# Patient Record
Sex: MALE | Race: WHITE | NOT HISPANIC OR LATINO
[De-identification: names, ages, dates, MRNs, and addresses within clinical notes are randomized per-mention and may not be internally consistent; named-entity substitution may affect disease eponyms.]

---

## 2019-09-03 ENCOUNTER — APPOINTMENT (OUTPATIENT)
Dept: INTERNAL MEDICINE | Facility: CLINIC | Age: 49
End: 2019-09-03
Payer: COMMERCIAL

## 2019-09-03 VITALS
SYSTOLIC BLOOD PRESSURE: 102 MMHG | BODY MASS INDEX: 22.88 KG/M2 | HEART RATE: 86 BPM | TEMPERATURE: 98.6 F | HEIGHT: 75 IN | WEIGHT: 184 LBS | OXYGEN SATURATION: 98 % | DIASTOLIC BLOOD PRESSURE: 61 MMHG

## 2019-09-03 DIAGNOSIS — K59.09 OTHER CONSTIPATION: ICD-10-CM

## 2019-09-03 DIAGNOSIS — L98.9 DISORDER OF THE SKIN AND SUBCUTANEOUS TISSUE, UNSPECIFIED: ICD-10-CM

## 2019-09-03 DIAGNOSIS — Z82.49 FAMILY HISTORY OF ISCHEMIC HEART DISEASE AND OTHER DISEASES OF THE CIRCULATORY SYSTEM: ICD-10-CM

## 2019-09-03 DIAGNOSIS — Z00.00 ENCOUNTER FOR GENERAL ADULT MEDICAL EXAMINATION W/OUT ABNORMAL FINDINGS: ICD-10-CM

## 2019-09-03 DIAGNOSIS — Z78.9 OTHER SPECIFIED HEALTH STATUS: ICD-10-CM

## 2019-09-03 PROCEDURE — 99386 PREV VISIT NEW AGE 40-64: CPT

## 2019-09-03 RX ORDER — OMEPRAZOLE, SODIUM BICARBONATE 40; 1680 MG/1; MG/1
40-1680 POWDER, FOR SUSPENSION ORAL
Refills: 0 | Status: DISCONTINUED | COMMUNITY
End: 2019-09-03

## 2019-09-03 RX ORDER — TADALAFIL 10 MG/1
10 TABLET, FILM COATED ORAL
Refills: 0 | Status: DISCONTINUED | COMMUNITY
End: 2019-09-03

## 2019-09-03 NOTE — HISTORY OF PRESENT ILLNESS
[FreeTextEntry1] : annual exam [de-identified] : annual\par - doing well recently moved to [place] from [place]\par - needs medication refill\par - recent physical from 6/2019 normal by previous PCP\par \par PreP\par - 1 recent episode of unprotected sex was placed on PEP which he finished recently\par - [facility]\par \par chronic constipation\par - has BM once/week\par \par HCM\par - colonoscopy due at 52yo

## 2019-09-03 NOTE — HEALTH RISK ASSESSMENT
[Excellent] : ~his/her~  mood as  excellent [Yes] : Yes [No falls in past year] : Patient reported no falls in the past year [0] : 2) Feeling down, depressed, or hopeless: Not at all (0) [None] : None [With Significant Other] : lives with significant other [Employed] : employed [] :  [High Risk Behavior] : high risk behavior [Sexually Active] : sexually active [Fully functional (bathing, dressing, toileting, transferring, walking, feeding)] : Fully functional (bathing, dressing, toileting, transferring, walking, feeding) [Fully functional (using the telephone, shopping, preparing meals, housekeeping, doing laundry, using] : Fully functional and needs no help or supervision to perform IADLs (using the telephone, shopping, preparing meals, housekeeping, doing laundry, using transportation, managing medications and managing finances) [With Patient/Caregiver] : With Patient/Caregiver [[id number]] : 0 [] : No [Change in mental status noted] : No change in mental status noted [Language] : denies difficulty with language [Behavior] : denies difficulty with behavior [Handling Complex Tasks] : denies difficulty handling complex tasks [Learning/Retaining New Information] : denies difficulty learning/retaining new information [Reasoning] : denies difficulty with reasoning [HIVDate] : 8/1/2019 [Spatial Ability and Orientation] : denies difficulty with spatial ability and orientation [AdvancecareDate] : 09/03/2019

## 2019-09-03 NOTE — PHYSICAL EXAM
[No Acute Distress] : no acute distress [Well Developed] : well developed [Well Nourished] : well nourished [Normal Sclera/Conjunctiva] : normal sclera/conjunctiva [Well-Appearing] : well-appearing [EOMI] : extraocular movements intact [Normal Outer Ear/Nose] : the outer ears and nose were normal in appearance [No JVD] : no jugular venous distention [Supple] : supple [No Lymphadenopathy] : no lymphadenopathy [Thyroid Normal, No Nodules] : the thyroid was normal and there were no nodules present [No Respiratory Distress] : no respiratory distress  [No Accessory Muscle Use] : no accessory muscle use [Normal Rate] : normal rate  [Clear to Auscultation] : lungs were clear to auscultation bilaterally [Regular Rhythm] : with a regular rhythm [Normal S1, S2] : normal S1 and S2 [No Murmur] : no murmur heard [No Varicosities] : no varicosities [No Edema] : there was no peripheral edema [No Palpable Aorta] : no palpable aorta [Soft] : abdomen soft [Non Tender] : non-tender [Non-distended] : non-distended [No Masses] : no abdominal mass palpated [No HSM] : no HSM [Normal Anterior Cervical Nodes] : no anterior cervical lymphadenopathy [Normal Supraclavicular Nodes] : no supraclavicular lymphadenopathy [No Rash] : no rash [Coordination Grossly Intact] : coordination grossly intact [Normal Gait] : normal gait [Deep Tendon Reflexes (DTR)] : deep tendon reflexes were 2+ and symmetric [Normal Affect] : the affect was normal [Alert and Oriented x3] : oriented to person, place, and time [Normal Insight/Judgement] : insight and judgment were intact [de-identified] : right lower lip, 1cm pale salmon colored lesion above vermillion boarder. ill-defined edges, no raised edges, no scabing

## 2019-09-03 NOTE — ASSESSMENT
[FreeTextEntry1] : - obtain labs from previous physical \par - obtain titers from employee health re hep A and B

## 2019-10-02 ENCOUNTER — MEDICATION RENEWAL (OUTPATIENT)
Age: 49
End: 2019-10-02

## 2019-10-11 ENCOUNTER — MEDICATION RENEWAL (OUTPATIENT)
Age: 49
End: 2019-10-11

## 2019-11-08 ENCOUNTER — MEDICATION RENEWAL (OUTPATIENT)
Age: 49
End: 2019-11-08

## 2019-12-11 ENCOUNTER — MEDICATION RENEWAL (OUTPATIENT)
Age: 49
End: 2019-12-11

## 2020-01-13 ENCOUNTER — RX RENEWAL (OUTPATIENT)
Age: 50
End: 2020-01-13

## 2020-02-13 ENCOUNTER — APPOINTMENT (OUTPATIENT)
Dept: INTERNAL MEDICINE | Facility: CLINIC | Age: 50
End: 2020-02-13
Payer: COMMERCIAL

## 2020-02-13 VITALS
DIASTOLIC BLOOD PRESSURE: 83 MMHG | HEART RATE: 86 BPM | HEIGHT: 75 IN | WEIGHT: 180 LBS | OXYGEN SATURATION: 99 % | TEMPERATURE: 98.2 F | BODY MASS INDEX: 22.38 KG/M2 | SYSTOLIC BLOOD PRESSURE: 131 MMHG

## 2020-02-13 PROCEDURE — 99214 OFFICE O/P EST MOD 30 MIN: CPT

## 2020-02-13 RX ORDER — BUPROPION HYDROCHLORIDE 300 MG/1
300 TABLET, EXTENDED RELEASE ORAL DAILY
Qty: 90 | Refills: 3 | Status: COMPLETED | COMMUNITY
End: 2020-02-13

## 2020-02-13 NOTE — HISTORY OF PRESENT ILLNESS
[FreeTextEntry1] : depression/anxiety f/u [de-identified] : depression\par - has worsened over the past few weeks\par - is also extremely anxious\par - employer asked him to get a letter to clear him for work\par - bupropion is no longer effective for pt. would like to try SSRI\par - previously on Celexa but caused severe sexual side effects, decreased libido and ED\par - is agreeable to Lexapro\par - follows w/ therapist, has difficulty finding psychiatrist who takes his insurance. \par - would like to try naltrexone\par - concerned b/c days he does not take klonopin he self medications w/ etoh. will have 3 beers at night\par - has family h/o alcoholism.

## 2020-02-13 NOTE — PHYSICAL EXAM
[Person] : oriented to person [Place] : oriented to place [Time] : oriented to time [Short Term Intact] : short term memory intact [Remote Intact] : remote memory intact [Concentration Intact] : normal concentrating ability [Span Intact] : the attention span was normal [Visual Intact] : visual attention was ~T not ~L decreased [Vocabulary] : adequate fund of knowledge regarding vocabulary [Anxious] : anxious [Depressed] : not depressed [Impaired judgment] : intact judgment [Impaired Insight] : intact insight [Normal Tone] : normal tone [Normal Articulation] : normal articulation [Normal Volume] : normal volume [Volume Increased] : no increase in volume [Normal Fluency] : fluent [Volume Decrease] : no decrease in volume [Normal Coherency] : coherent [Rate Pressured] : pressured [Rate Slowed] : not slowed [Normal] : normal throught processes [Normal Spontaneity] : spontaneous [Disconnected] : no disconnected thoughts [Slowed Rate Of Thought] : no slowing of rate of thought [Racing Thoughts] : no racing thoughts [Disordered] : no disordered thoughts [Perseveration] : no thought perseveration [Normal Associations] :  no deficiency [Loose Associations] : no loosening of associations [Delusions] : exhibited no delusions [Circumferential] : no circumferentiality [Tangentiality] : no tangentiality [Suicidal Ideation] : denied suicidal ideation [Suicidal Intent] : denied suicidal intent [Hallucinations] : exhibited no hallucinations [Suicidal Plan] : denied suicidal plans [Homicidal Ideation] : denied homicidal ideation [Homicidal Plan] : denied homicidal plans [Homicidal Intent] : denied homicidal intention

## 2020-03-19 ENCOUNTER — TRANSCRIPTION ENCOUNTER (OUTPATIENT)
Age: 50
End: 2020-03-19

## 2020-03-20 ENCOUNTER — TRANSCRIPTION ENCOUNTER (OUTPATIENT)
Age: 50
End: 2020-03-20

## 2020-03-20 RX ORDER — FINASTERIDE 1 MG/1
1 TABLET ORAL DAILY
Qty: 90 | Refills: 3 | Status: DISCONTINUED | COMMUNITY
End: 2020-03-20

## 2020-05-29 ENCOUNTER — TRANSCRIPTION ENCOUNTER (OUTPATIENT)
Age: 50
End: 2020-05-29

## 2020-05-29 DIAGNOSIS — Z72.51 HIGH RISK HETEROSEXUAL BEHAVIOR: ICD-10-CM

## 2020-06-08 ENCOUNTER — TRANSCRIPTION ENCOUNTER (OUTPATIENT)
Age: 50
End: 2020-06-08

## 2020-07-20 ENCOUNTER — RX RENEWAL (OUTPATIENT)
Age: 50
End: 2020-07-20

## 2020-07-30 ENCOUNTER — APPOINTMENT (OUTPATIENT)
Dept: INTERNAL MEDICINE | Facility: CLINIC | Age: 50
End: 2020-07-30
Payer: COMMERCIAL

## 2020-07-30 VITALS
HEIGHT: 75 IN | BODY MASS INDEX: 23.87 KG/M2 | SYSTOLIC BLOOD PRESSURE: 120 MMHG | HEART RATE: 71 BPM | WEIGHT: 192 LBS | OXYGEN SATURATION: 96 % | DIASTOLIC BLOOD PRESSURE: 78 MMHG | TEMPERATURE: 98.3 F

## 2020-07-30 PROCEDURE — 99213 OFFICE O/P EST LOW 20 MIN: CPT | Mod: 25

## 2020-07-30 PROCEDURE — 36415 COLL VENOUS BLD VENIPUNCTURE: CPT

## 2020-07-30 NOTE — HISTORY OF PRESENT ILLNESS
[FreeTextEntry1] : STD screen and medication refill [de-identified] : Anxiety\par - complient w/ medications\par - mood much improved\par \par PrEP\par - needs refill and STD screening today\par \par COVID19\par - he and  were both very sick with covid\par - have recovered  developed hematomas on LE\par \par EtOH abuse\par - naltrexone has decreased craving greatly.

## 2020-07-31 ENCOUNTER — TRANSCRIPTION ENCOUNTER (OUTPATIENT)
Age: 50
End: 2020-07-31

## 2020-07-31 LAB
APPEARANCE: CLEAR
BILIRUBIN URINE: NEGATIVE
BLOOD URINE: NEGATIVE
C TRACH RRNA SPEC QL NAA+PROBE: NOT DETECTED
COLOR: NORMAL
GLUCOSE QUALITATIVE U: NEGATIVE
HIV1+2 AB SPEC QL IA.RAPID: NONREACTIVE
KETONES URINE: NEGATIVE
LEUKOCYTE ESTERASE URINE: NEGATIVE
N GONORRHOEA RRNA SPEC QL NAA+PROBE: NOT DETECTED
NITRITE URINE: NEGATIVE
PH URINE: 6
PROTEIN URINE: NEGATIVE
SOURCE AMPLIFICATION: NORMAL
SPECIFIC GRAVITY URINE: 1.01
T PALLIDUM AB SER QL IA: NEGATIVE
UROBILINOGEN URINE: NORMAL

## 2020-10-20 ENCOUNTER — TRANSCRIPTION ENCOUNTER (OUTPATIENT)
Age: 50
End: 2020-10-20

## 2020-11-20 ENCOUNTER — TRANSCRIPTION ENCOUNTER (OUTPATIENT)
Age: 50
End: 2020-11-20

## 2020-12-23 ENCOUNTER — TRANSCRIPTION ENCOUNTER (OUTPATIENT)
Age: 50
End: 2020-12-23

## 2021-01-13 ENCOUNTER — TRANSCRIPTION ENCOUNTER (OUTPATIENT)
Age: 51
End: 2021-01-13

## 2021-02-11 ENCOUNTER — TRANSCRIPTION ENCOUNTER (OUTPATIENT)
Age: 51
End: 2021-02-11

## 2021-02-12 ENCOUNTER — TRANSCRIPTION ENCOUNTER (OUTPATIENT)
Age: 51
End: 2021-02-12

## 2021-02-12 DIAGNOSIS — Z11.3 ENCOUNTER FOR SCREENING FOR INFECTIONS WITH A PREDOMINANTLY SEXUAL MODE OF TRANSMISSION: ICD-10-CM

## 2021-04-01 ENCOUNTER — TRANSCRIPTION ENCOUNTER (OUTPATIENT)
Age: 51
End: 2021-04-01

## 2021-04-22 ENCOUNTER — EMERGENCY (EMERGENCY)
Facility: HOSPITAL | Age: 51
LOS: 1 days | Discharge: HOME | End: 2021-04-22
Attending: EMERGENCY MEDICINE | Admitting: EMERGENCY MEDICINE
Payer: COMMERCIAL

## 2021-04-22 ENCOUNTER — APPOINTMENT (OUTPATIENT)
Dept: INTERNAL MEDICINE | Facility: CLINIC | Age: 51
End: 2021-04-22
Payer: COMMERCIAL

## 2021-04-22 ENCOUNTER — NON-APPOINTMENT (OUTPATIENT)
Age: 51
End: 2021-04-22

## 2021-04-22 VITALS
SYSTOLIC BLOOD PRESSURE: 127 MMHG | RESPIRATION RATE: 18 BRPM | TEMPERATURE: 98 F | OXYGEN SATURATION: 98 % | HEART RATE: 82 BPM | DIASTOLIC BLOOD PRESSURE: 80 MMHG

## 2021-04-22 VITALS
RESPIRATION RATE: 18 BRPM | HEART RATE: 102 BPM | WEIGHT: 179.9 LBS | OXYGEN SATURATION: 98 % | HEIGHT: 75 IN | DIASTOLIC BLOOD PRESSURE: 73 MMHG | TEMPERATURE: 98 F | SYSTOLIC BLOOD PRESSURE: 124 MMHG

## 2021-04-22 VITALS
HEIGHT: 75 IN | WEIGHT: 180 LBS | BODY MASS INDEX: 22.38 KG/M2 | SYSTOLIC BLOOD PRESSURE: 131 MMHG | TEMPERATURE: 98.1 F | HEART RATE: 88 BPM | DIASTOLIC BLOOD PRESSURE: 79 MMHG | OXYGEN SATURATION: 98 %

## 2021-04-22 DIAGNOSIS — N45.3 EPIDIDYMO-ORCHITIS: ICD-10-CM

## 2021-04-22 DIAGNOSIS — N50.812 LEFT TESTICULAR PAIN: ICD-10-CM

## 2021-04-22 LAB
ALBUMIN SERPL ELPH-MCNC: 4.5 G/DL — SIGNIFICANT CHANGE UP (ref 3.3–5)
ALP SERPL-CCNC: 139 U/L — HIGH (ref 40–120)
ALT FLD-CCNC: 22 U/L — SIGNIFICANT CHANGE UP (ref 10–45)
ANION GAP SERPL CALC-SCNC: 10 MMOL/L — SIGNIFICANT CHANGE UP (ref 5–17)
APPEARANCE UR: ABNORMAL
AST SERPL-CCNC: 28 U/L — SIGNIFICANT CHANGE UP (ref 10–40)
BACTERIA # UR AUTO: PRESENT /HPF
BASOPHILS # BLD AUTO: 0.05 K/UL — SIGNIFICANT CHANGE UP (ref 0–0.2)
BASOPHILS NFR BLD AUTO: 0.4 % — SIGNIFICANT CHANGE UP (ref 0–2)
BILIRUB SERPL-MCNC: 0.2 MG/DL — SIGNIFICANT CHANGE UP (ref 0.2–1.2)
BILIRUB UR-MCNC: NEGATIVE — SIGNIFICANT CHANGE UP
BUN SERPL-MCNC: 9 MG/DL — SIGNIFICANT CHANGE UP (ref 7–23)
CALCIUM SERPL-MCNC: 9.2 MG/DL — SIGNIFICANT CHANGE UP (ref 8.4–10.5)
CHLORIDE SERPL-SCNC: 100 MMOL/L — SIGNIFICANT CHANGE UP (ref 96–108)
CO2 SERPL-SCNC: 31 MMOL/L — SIGNIFICANT CHANGE UP (ref 22–31)
COLOR SPEC: YELLOW — SIGNIFICANT CHANGE UP
COMMENT - URINE: SIGNIFICANT CHANGE UP
CREAT SERPL-MCNC: 0.84 MG/DL — SIGNIFICANT CHANGE UP (ref 0.5–1.3)
DIFF PNL FLD: ABNORMAL
EOSINOPHIL # BLD AUTO: 0.13 K/UL — SIGNIFICANT CHANGE UP (ref 0–0.5)
EOSINOPHIL NFR BLD AUTO: 1.1 % — SIGNIFICANT CHANGE UP (ref 0–6)
EPI CELLS # UR: SIGNIFICANT CHANGE UP /HPF (ref 0–5)
GLUCOSE SERPL-MCNC: 82 MG/DL — SIGNIFICANT CHANGE UP (ref 70–99)
GLUCOSE UR QL: NEGATIVE — SIGNIFICANT CHANGE UP
HCT VFR BLD CALC: 42.9 % — SIGNIFICANT CHANGE UP (ref 39–50)
HGB BLD-MCNC: 14.4 G/DL — SIGNIFICANT CHANGE UP (ref 13–17)
IMM GRANULOCYTES NFR BLD AUTO: 0.5 % — SIGNIFICANT CHANGE UP (ref 0–1.5)
KETONES UR-MCNC: NEGATIVE — SIGNIFICANT CHANGE UP
LEUKOCYTE ESTERASE UR-ACNC: ABNORMAL
LYMPHOCYTES # BLD AUTO: 1.55 K/UL — SIGNIFICANT CHANGE UP (ref 1–3.3)
LYMPHOCYTES # BLD AUTO: 13.7 % — SIGNIFICANT CHANGE UP (ref 13–44)
MCHC RBC-ENTMCNC: 31 PG — SIGNIFICANT CHANGE UP (ref 27–34)
MCHC RBC-ENTMCNC: 33.6 GM/DL — SIGNIFICANT CHANGE UP (ref 32–36)
MCV RBC AUTO: 92.3 FL — SIGNIFICANT CHANGE UP (ref 80–100)
MONOCYTES # BLD AUTO: 0.77 K/UL — SIGNIFICANT CHANGE UP (ref 0–0.9)
MONOCYTES NFR BLD AUTO: 6.8 % — SIGNIFICANT CHANGE UP (ref 2–14)
NEUTROPHILS # BLD AUTO: 8.76 K/UL — HIGH (ref 1.8–7.4)
NEUTROPHILS NFR BLD AUTO: 77.5 % — HIGH (ref 43–77)
NITRITE UR-MCNC: NEGATIVE — SIGNIFICANT CHANGE UP
NRBC # BLD: 0 /100 WBCS — SIGNIFICANT CHANGE UP (ref 0–0)
PH UR: 7 — SIGNIFICANT CHANGE UP (ref 5–8)
PLATELET # BLD AUTO: 308 K/UL — SIGNIFICANT CHANGE UP (ref 150–400)
POTASSIUM SERPL-MCNC: 3.9 MMOL/L — SIGNIFICANT CHANGE UP (ref 3.5–5.3)
POTASSIUM SERPL-SCNC: 3.9 MMOL/L — SIGNIFICANT CHANGE UP (ref 3.5–5.3)
PROT SERPL-MCNC: 7.5 G/DL — SIGNIFICANT CHANGE UP (ref 6–8.3)
PROT UR-MCNC: NEGATIVE MG/DL — SIGNIFICANT CHANGE UP
RBC # BLD: 4.65 M/UL — SIGNIFICANT CHANGE UP (ref 4.2–5.8)
RBC # FLD: 13.2 % — SIGNIFICANT CHANGE UP (ref 10.3–14.5)
RBC CASTS # UR COMP ASSIST: < 5 /HPF — SIGNIFICANT CHANGE UP
SODIUM SERPL-SCNC: 141 MMOL/L — SIGNIFICANT CHANGE UP (ref 135–145)
SP GR SPEC: 1.01 — SIGNIFICANT CHANGE UP (ref 1–1.03)
UROBILINOGEN FLD QL: 0.2 E.U./DL — SIGNIFICANT CHANGE UP
WBC # BLD: 11.32 K/UL — HIGH (ref 3.8–10.5)
WBC # FLD AUTO: 11.32 K/UL — HIGH (ref 3.8–10.5)
WBC UR QL: ABNORMAL /HPF

## 2021-04-22 PROCEDURE — 99284 EMERGENCY DEPT VISIT MOD MDM: CPT

## 2021-04-22 PROCEDURE — 99282 EMERGENCY DEPT VISIT SF MDM: CPT

## 2021-04-22 PROCEDURE — 87591 N.GONORRHOEAE DNA AMP PROB: CPT

## 2021-04-22 PROCEDURE — 36000 PLACE NEEDLE IN VEIN: CPT

## 2021-04-22 PROCEDURE — 36415 COLL VENOUS BLD VENIPUNCTURE: CPT

## 2021-04-22 PROCEDURE — 99214 OFFICE O/P EST MOD 30 MIN: CPT | Mod: 25

## 2021-04-22 PROCEDURE — 85025 COMPLETE CBC W/AUTO DIFF WBC: CPT

## 2021-04-22 PROCEDURE — 99284 EMERGENCY DEPT VISIT MOD MDM: CPT | Mod: 25

## 2021-04-22 PROCEDURE — 87086 URINE CULTURE/COLONY COUNT: CPT

## 2021-04-22 PROCEDURE — 81001 URINALYSIS AUTO W/SCOPE: CPT

## 2021-04-22 PROCEDURE — 80053 COMPREHEN METABOLIC PANEL: CPT

## 2021-04-22 PROCEDURE — 99072 ADDL SUPL MATRL&STAF TM PHE: CPT

## 2021-04-22 PROCEDURE — 76870 US EXAM SCROTUM: CPT | Mod: 26

## 2021-04-22 PROCEDURE — 87491 CHLMYD TRACH DNA AMP PROBE: CPT

## 2021-04-22 PROCEDURE — 76870 US EXAM SCROTUM: CPT

## 2021-04-22 PROCEDURE — 96372 THER/PROPH/DIAG INJ SC/IM: CPT

## 2021-04-22 RX ORDER — IBUPROFEN 200 MG
400 TABLET ORAL ONCE
Refills: 0 | Status: COMPLETED | OUTPATIENT
Start: 2021-04-22 | End: 2021-04-22

## 2021-04-22 RX ORDER — OXYCODONE AND ACETAMINOPHEN 5; 325 MG/1; MG/1
2 TABLET ORAL ONCE
Refills: 0 | Status: DISCONTINUED | OUTPATIENT
Start: 2021-04-22 | End: 2021-04-22

## 2021-04-22 RX ORDER — CEFTRIAXONE 500 MG/1
500 INJECTION, POWDER, FOR SOLUTION INTRAMUSCULAR; INTRAVENOUS ONCE
Refills: 0 | Status: COMPLETED | OUTPATIENT
Start: 2021-04-22 | End: 2021-04-22

## 2021-04-22 RX ORDER — IBUPROFEN 200 MG
1 TABLET ORAL
Qty: 28 | Refills: 0
Start: 2021-04-22 | End: 2021-04-28

## 2021-04-22 RX ORDER — SODIUM CHLORIDE 9 MG/ML
3 INJECTION INTRAMUSCULAR; INTRAVENOUS; SUBCUTANEOUS EVERY 8 HOURS
Refills: 0 | Status: DISCONTINUED | OUTPATIENT
Start: 2021-04-22 | End: 2021-04-26

## 2021-04-22 RX ADMIN — Medication 100 MILLIGRAM(S): at 17:11

## 2021-04-22 RX ADMIN — OXYCODONE AND ACETAMINOPHEN 2 TABLET(S): 5; 325 TABLET ORAL at 17:11

## 2021-04-22 RX ADMIN — CEFTRIAXONE 500 MILLIGRAM(S): 500 INJECTION, POWDER, FOR SOLUTION INTRAMUSCULAR; INTRAVENOUS at 17:11

## 2021-04-22 RX ADMIN — Medication 400 MILLIGRAM(S): at 18:45

## 2021-04-22 NOTE — ED PROVIDER NOTE - CARE PROVIDER_API CALL
Chuckie Robbins)  Urology [facility]  [address] [address]  Phone: [phone]  Fax: [phone]  Follow Up Time: 1-3 Days

## 2021-04-22 NOTE — CONSULT NOTE ADULT - ATTENDING COMMENTS
Patient imaging and story reviewed in detail and discussed.   Epididymorchitis.   Antibiotics and follow-up in clinic.

## 2021-04-22 NOTE — ED PROVIDER NOTE - GENITOURINARY SCROTUM
QUESTIONABLE LT FOCAL INFERIOR THAT CAN CONTAIN FLUID/SWELLING left inf aspect of scrotum with swelling noted erythema  no fluctuance or crepitus/SWELLING

## 2021-04-22 NOTE — ED ADULT NURSE NOTE - OBJECTIVE STATEMENT
Pt came to ED complaining of testicular swelling starting today, PT reports discomfort starting 3 days ago. Pt presents with scrotal swelling and pain, more on left side.   Pt reports having multiple episodes of unprotected sex for the past week. Pt also reports intermittent use of penile ring.  Pt denies fever, chills, D/N/V, chest pain, GI symptoms, SOB.  Pt is alert and oriented x3, ambulatory with even gait.   No bleeding or discharge noted at this time.

## 2021-04-22 NOTE — ED PROVIDER NOTE - PATIENT PORTAL LINK FT
You can access the FollowMyHealth Patient Portal offered by [facility] by registering at the following website: [email or web address]. By joining [email or web address]’s FollowMyHealth portal, you will also be able to view your health information using other applications (apps) compatible with our system.

## 2021-04-22 NOTE — CONSULT NOTE ADULT - SUBJECTIVE AND OBJECTIVE BOX
CONSULT NOTE:    HPI: 51 yo with hx anxiety, depression, gonorrhea and cocaine use, on truvada for preop, presenting to ER for scrotal pain and swelling for two days. Patient states the swelling was worse this morning prompting him to go to his pmd who examined him and recommended he go to the ER.  Patient states he is recently seperated with his partner and has had unprotected sex with multiple male partners this past week. He denies any hematuria or penile discharge but admits to some discomfort with urinating. He denies any abd pain, nausea or vomiting, Denies any fever or chills      Vital Signs Last 24 Hrs  T(C): 36.7 (22 Apr 2021 16:33), Max: 36.7 (22 Apr 2021 16:33)  T(F): 98 (22 Apr 2021 16:33), Max: 98 (22 Apr 2021 16:33)  HR: 102 (22 Apr 2021 16:33) (102 - 102)  BP: 124/73 (22 Apr 2021 16:33) (124/73 - 124/73)  BP(mean): --  RR: 18 (22 Apr 2021 16:33) (18 - 18)  SpO2: 98% (22 Apr 2021 16:33) (98% - 98%)  I&O's Summary      PE:  Gen: nad  Abd: soft, nd, nt  : circumcised phallus non tender to palpation no blood or discharge from meatus, erythematous, edematous scrotum. non tender right testicle, Enlarged hardened left testicle exquistely tender to palpation with overlying cellulitic skin, no areas of fluctuance or crepitus appreciated at this time    LABS:                        14.4   11.32 )-----------( 308      ( 22 Apr 2021 16:58 )             42.9     04-22    141  |  100  |  9   ----------------------------<  82  3.9   |  31  |  0.84    Ca    9.2      22 Apr 2021 16:58    TPro  7.5  /  Alb  4.5  /  TBili  0.2  /  DBili  x   /  AST  28  /  ALT  22  /  AlkPhos  139<H>  04-22      Cultures      A/P 51 yo m with epididymoorchitis and 1 cm pyocele, afebrile and hemodynamically stable, wbc 11  1)ER administered rocephin and doxy rec 10 days course of doxy  2) f/u gc, chlam, syphillis, HIV  3) ice elevation and nsaids  4) discussed with patient importance of safe sexual practices  5) discussed with gu attending

## 2021-04-22 NOTE — ED PROVIDER NOTE - NSTIMEPROVIDERCAREINITIATE_GEN_ER
11/05/20 0731   Clinical Encounter Type   Visited With Family   Routine Visit Introduction  ([facility] requesed.   Completed over the phone (COVID restriction due to  in-person interview))   Referral From    Referral To Nurse;Physician  (The [facility] fo 22-Apr-2021 16:39

## 2021-04-22 NOTE — ED PROVIDER NOTE - GENITOURINARY [+], MLM
left testicular swelling/DIFFICULTY URINATING/DYSURIA left testicular swelling/ slight erythema / no fluctuance or crepitus/DIFFICULTY URINATING/DYSURIA

## 2021-04-22 NOTE — ED PROVIDER NOTE - OBJECTIVE STATEMENT
50 year old male presents left testicular swelling x1 day. Hx of GC x7 years ago no history of other stds. He was sent to the ed for evaluation by pcp. As per pt, he has unprotected sex multiple times with multiple people and likes to wear cock rings during sex. The last encounter was x3 days ago. Denies redness, hematuria, fever, discharge, nausea, chills or vomiting. Admits to unprotected anal sex with multiple people. He is able to pass urine with difficulty felt "trickling", as if not fully voiding. 50 year old male presents left testicular tenderness and swelling x1 day. Hx of GC x7 years ago no history of other stds. He was sent to the ed for evaluation by pcp. As per pt, he has unprotected sex multiple times with multiple people and likes to wear cock rings during sex. The last encounter was x3 days ago. Denies redness, hematuria, fever, discharge, nausea, chills or vomiting. Admits to unprotected anal sex with multiple people. He is able to pass urine with difficulty felt "trickling", as if not fully voiding.

## 2021-04-22 NOTE — ED PROVIDER NOTE - CLINICAL SUMMARY MEDICAL DECISION MAKING FREE TEXT BOX
50 year old male with multiple sexual partners and previous std episodes presents left testicular swelling x1 day. On exam, swelling of the left scrotum is noted with erythema no crepitus no lesion, no skin breaks or openings. Suspect epididymitis orchitis. Plan administer Rocephin + doxycycline; send for scrotal doppler. Low suspicion for testicular torsion.

## 2021-04-22 NOTE — HISTORY OF PRESENT ILLNESS
[FreeTextEntry8] : Testicular pain\par - progressive over the past 4 days\par - a/w scrotal swelling L>R\par - testicles are very tender to palpation\par - no help w/ ibuprofen\par - accidentally fell asleep with external compression ring \par \par PrEP\par - needs screening today

## 2021-04-22 NOTE — PHYSICAL EXAM
[No Respiratory Distress] : no respiratory distress  [Penis Abnormality] : normal circumcised penis [de-identified] : unable to sit b/c of pain [FreeTextEntry1] : testicular swelling L>R very tender to palpation, firm mass palpable.

## 2021-04-22 NOTE — ED PROVIDER NOTE - NSFOLLOWUPINSTRUCTIONS_ED_ALL_ED_FT
Epididymo-Orchitis    AMBULATORY CARE:    Epididymo-orchitis is inflammation of your epididymis and testicle. The epididymis is a coiled tube inside your scrotum. It stores and carries sperm from your testicles to your penis. Epididymo-orchitis usually affects the epididymis and testicle on one side, but it may affect both sides.    Male Reproductive System         What are the signs and symptoms of epididymo-orchitis?   •Pain or tenderness in your scrotum, abdomen, or groin      •Redness or swelling of your scrotum      •Pain or burning during urination, or frequent urination      •Discharge from your penis or blood in your urine or semen      •Fever      Seek care immediately if:   •You have severe pain in your testicles.      •Your symptoms become worse even after you start treatment with medicine.      Contact your healthcare provider if:   •Your symptoms do not get better within 3 days of treatment or come back after treatment.      •You have a hot, red, tender area on your testicles.      •You have questions or concerns about your condition or care.      Treatment depends on the cause of your epididymo-orchitis and may include any of the following:   •Antibiotics help treat a bacterial infection.       •NSAIDs, such as ibuprofen, help decrease swelling, pain, and fever. This medicine is available with or without a doctor's order. NSAIDs can cause stomach bleeding or kidney problems in certain people. If you take blood thinner medicine, always ask if NSAIDs are safe for you. Always read the medicine label and follow directions. Do not give these medicines to children under 6 months of age without direction from your child's healthcare provider.      •Acetaminophen decreases pain and fever. It is available without a doctor's order. Ask how much to take and how often to take it. Follow directions. Acetaminophen can cause liver damage if not taken correctly.      •Prescription pain medicine may be given. Ask how to take this medicine safely.      •Surgery may be needed if your condition gets worse or becomes chronic. Surgery to drain an abscess (collection of pus) may be needed. Surgery to remove part or all of your epididymis or testicle may also be done.       Manage epididymo-orchitis:   •Apply ice on your testicles for 15 to 20 minutes every hour or as directed. Use an ice pack, or put crushed ice in a plastic bag. Cover it with a towel. Ice helps prevent tissue damage and decreases swelling and pain.      •Rest in bed as directed. Elevate your scrotum when you sit or lie down to help reduce swelling and pain. You may be asked to do this by placing a rolled-up towel under your scrotum.      •Scrotal support may be recommended. An athletic supporter provides scrotal support and may make you more comfortable when you stand. Ask your healthcare provider how to use an athletic supporter.       •Do not lift heavy objects. You can make swelling worse if you lift heavy objects or strain.       Follow up with your healthcare provider as directed: Write down your questions so you remember to ask them during your visits.        © Copyright [employer] 2021           back to top                          © Copyright [employer] 2021

## 2021-04-23 ENCOUNTER — TRANSCRIPTION ENCOUNTER (OUTPATIENT)
Age: 51
End: 2021-04-23

## 2021-04-23 PROBLEM — N45.3 EPIDIDYMO-ORCHITIS: Status: ACTIVE | Noted: 2021-04-22

## 2021-04-23 LAB
ALBUMIN SERPL ELPH-MCNC: 4.4 G/DL
ALP BLD-CCNC: 128 U/L
ALT SERPL-CCNC: 21 U/L
ANION GAP SERPL CALC-SCNC: 12 MMOL/L
AST SERPL-CCNC: 25 U/L
BASOPHILS # BLD AUTO: 0.05 K/UL
BASOPHILS NFR BLD AUTO: 0.5 %
BILIRUB SERPL-MCNC: <0.2 MG/DL
BUN SERPL-MCNC: 13 MG/DL
C TRACH RRNA SPEC QL NAA+PROBE: NOT DETECTED
C TRACH RRNA SPEC QL NAA+PROBE: SIGNIFICANT CHANGE UP
CALCIUM SERPL-MCNC: 9.6 MG/DL
CHLORIDE SERPL-SCNC: 101 MMOL/L
CO2 SERPL-SCNC: 30 MMOL/L
CREAT SERPL-MCNC: 0.94 MG/DL
CRP SERPL-MCNC: 14 MG/L
EOSINOPHIL # BLD AUTO: 0.12 K/UL
EOSINOPHIL NFR BLD AUTO: 1.1 %
GLUCOSE SERPL-MCNC: 84 MG/DL
HCT VFR BLD CALC: 45.2 %
HGB BLD-MCNC: 14.2 G/DL
HIV1+2 AB SPEC QL IA.RAPID: NONREACTIVE
IMM GRANULOCYTES NFR BLD AUTO: 0.3 %
LYMPHOCYTES # BLD AUTO: 1.39 K/UL
LYMPHOCYTES NFR BLD AUTO: 13.1 %
MAN DIFF?: NORMAL
MCHC RBC-ENTMCNC: 30.5 PG
MCHC RBC-ENTMCNC: 31.4 GM/DL
MCV RBC AUTO: 97 FL
MONOCYTES # BLD AUTO: 1.04 K/UL
MONOCYTES NFR BLD AUTO: 9.8 %
N GONORRHOEA RRNA SPEC QL NAA+PROBE: NOT DETECTED
N GONORRHOEA RRNA SPEC QL NAA+PROBE: SIGNIFICANT CHANGE UP
NEUTROPHILS # BLD AUTO: 8 K/UL
NEUTROPHILS NFR BLD AUTO: 75.2 %
PLATELET # BLD AUTO: 317 K/UL
POTASSIUM SERPL-SCNC: 4 MMOL/L
PROT SERPL-MCNC: 7 G/DL
RBC # BLD: 4.66 M/UL
RBC # FLD: 13.8 %
SODIUM SERPL-SCNC: 142 MMOL/L
SOURCE AMPLIFICATION: NORMAL
SPECIMEN SOURCE: SIGNIFICANT CHANGE UP
T PALLIDUM AB SER QL IA: NEGATIVE
WBC # FLD AUTO: 10.63 K/UL

## 2021-04-24 LAB
CULTURE RESULTS: SIGNIFICANT CHANGE UP
SPECIMEN SOURCE: SIGNIFICANT CHANGE UP

## 2021-06-28 ENCOUNTER — TRANSCRIPTION ENCOUNTER (OUTPATIENT)
Age: 51
End: 2021-06-28

## 2021-07-24 ENCOUNTER — INPATIENT (INPATIENT)
Facility: HOSPITAL | Age: 51
LOS: 2 days | Discharge: HOME | DRG: 728 | End: 2021-07-27
Attending: STUDENT IN AN ORGANIZED HEALTH CARE EDUCATION/TRAINING PROGRAM | Admitting: STUDENT IN AN ORGANIZED HEALTH CARE EDUCATION/TRAINING PROGRAM
Payer: COMMERCIAL

## 2021-07-24 VITALS
HEART RATE: 81 BPM | OXYGEN SATURATION: 98 % | TEMPERATURE: 98 F | WEIGHT: 86.2 LBS | DIASTOLIC BLOOD PRESSURE: 49 MMHG | HEIGHT: 75 IN | RESPIRATION RATE: 18 BRPM | SYSTOLIC BLOOD PRESSURE: 110 MMHG

## 2021-07-24 DIAGNOSIS — Z29.9 ENCOUNTER FOR PROPHYLACTIC MEASURES, UNSPECIFIED: ICD-10-CM

## 2021-07-24 DIAGNOSIS — Z87.448 PERSONAL HISTORY OF OTHER DISEASES OF URINARY SYSTEM: Chronic | ICD-10-CM

## 2021-07-24 DIAGNOSIS — F41.9 ANXIETY DISORDER, UNSPECIFIED: ICD-10-CM

## 2021-07-24 DIAGNOSIS — N39.0 URINARY TRACT INFECTION, SITE NOT SPECIFIED: ICD-10-CM

## 2021-07-24 DIAGNOSIS — F32.9 MAJOR DEPRESSIVE DISORDER, SINGLE EPISODE, UNSPECIFIED: ICD-10-CM

## 2021-07-24 DIAGNOSIS — N45.3 EPIDIDYMO-ORCHITIS: ICD-10-CM

## 2021-07-24 DIAGNOSIS — K21.9 GASTRO-ESOPHAGEAL REFLUX DISEASE WITHOUT ESOPHAGITIS: ICD-10-CM

## 2021-07-24 DIAGNOSIS — D64.9 ANEMIA, UNSPECIFIED: ICD-10-CM

## 2021-07-24 LAB
ANION GAP SERPL CALC-SCNC: 8 MMOL/L — SIGNIFICANT CHANGE UP (ref 5–17)
APPEARANCE UR: ABNORMAL
BACTERIA # UR AUTO: ABNORMAL /HPF
BASOPHILS # BLD AUTO: 0.05 K/UL — SIGNIFICANT CHANGE UP (ref 0–0.2)
BASOPHILS NFR BLD AUTO: 0.4 % — SIGNIFICANT CHANGE UP (ref 0–2)
BILIRUB UR-MCNC: ABNORMAL
BUN SERPL-MCNC: 8 MG/DL — SIGNIFICANT CHANGE UP (ref 7–23)
CALCIUM SERPL-MCNC: 9.1 MG/DL — SIGNIFICANT CHANGE UP (ref 8.4–10.5)
CHLORIDE SERPL-SCNC: 101 MMOL/L — SIGNIFICANT CHANGE UP (ref 96–108)
CO2 SERPL-SCNC: 32 MMOL/L — HIGH (ref 22–31)
COLOR SPEC: YELLOW — SIGNIFICANT CHANGE UP
COMMENT - URINE: SIGNIFICANT CHANGE UP
CREAT SERPL-MCNC: 0.79 MG/DL — SIGNIFICANT CHANGE UP (ref 0.5–1.3)
DIFF PNL FLD: ABNORMAL
EOSINOPHIL # BLD AUTO: 0.22 K/UL — SIGNIFICANT CHANGE UP (ref 0–0.5)
EOSINOPHIL NFR BLD AUTO: 1.6 % — SIGNIFICANT CHANGE UP (ref 0–6)
EPI CELLS # UR: SIGNIFICANT CHANGE UP /HPF (ref 0–5)
GLUCOSE SERPL-MCNC: 109 MG/DL — HIGH (ref 70–99)
GLUCOSE UR QL: NEGATIVE — SIGNIFICANT CHANGE UP
HCT VFR BLD CALC: 37 % — LOW (ref 39–50)
HGB BLD-MCNC: 11.7 G/DL — LOW (ref 13–17)
HIV 1+2 AB+HIV1 P24 AG SERPL QL IA: SIGNIFICANT CHANGE UP
IMM GRANULOCYTES NFR BLD AUTO: 0.7 % — SIGNIFICANT CHANGE UP (ref 0–1.5)
KETONES UR-MCNC: NEGATIVE — SIGNIFICANT CHANGE UP
LEUKOCYTE ESTERASE UR-ACNC: ABNORMAL
LYMPHOCYTES # BLD AUTO: 1.2 K/UL — SIGNIFICANT CHANGE UP (ref 1–3.3)
LYMPHOCYTES # BLD AUTO: 8.8 % — LOW (ref 13–44)
MCHC RBC-ENTMCNC: 28.9 PG — SIGNIFICANT CHANGE UP (ref 27–34)
MCHC RBC-ENTMCNC: 31.6 GM/DL — LOW (ref 32–36)
MCV RBC AUTO: 91.4 FL — SIGNIFICANT CHANGE UP (ref 80–100)
MONOCYTES # BLD AUTO: 1.25 K/UL — HIGH (ref 0–0.9)
MONOCYTES NFR BLD AUTO: 9.1 % — SIGNIFICANT CHANGE UP (ref 2–14)
NEUTROPHILS # BLD AUTO: 10.88 K/UL — HIGH (ref 1.8–7.4)
NEUTROPHILS NFR BLD AUTO: 79.4 % — HIGH (ref 43–77)
NITRITE UR-MCNC: POSITIVE
NRBC # BLD: 0 /100 WBCS — SIGNIFICANT CHANGE UP (ref 0–0)
PH UR: 6 — SIGNIFICANT CHANGE UP (ref 5–8)
PLATELET # BLD AUTO: 314 K/UL — SIGNIFICANT CHANGE UP (ref 150–400)
POTASSIUM SERPL-MCNC: 4 MMOL/L — SIGNIFICANT CHANGE UP (ref 3.5–5.3)
POTASSIUM SERPL-SCNC: 4 MMOL/L — SIGNIFICANT CHANGE UP (ref 3.5–5.3)
PROT UR-MCNC: 100 MG/DL
RBC # BLD: 4.05 M/UL — LOW (ref 4.2–5.8)
RBC # FLD: 13.4 % — SIGNIFICANT CHANGE UP (ref 10.3–14.5)
RBC CASTS # UR COMP ASSIST: < 5 /HPF — SIGNIFICANT CHANGE UP
SARS-COV-2 RNA SPEC QL NAA+PROBE: SIGNIFICANT CHANGE UP
SODIUM SERPL-SCNC: 141 MMOL/L — SIGNIFICANT CHANGE UP (ref 135–145)
SP GR SPEC: 1.02 — SIGNIFICANT CHANGE UP (ref 1–1.03)
UROBILINOGEN FLD QL: 2 E.U./DL
WBC # BLD: 13.7 K/UL — HIGH (ref 3.8–10.5)
WBC # FLD AUTO: 13.7 K/UL — HIGH (ref 3.8–10.5)
WBC UR QL: ABNORMAL /HPF

## 2021-07-24 PROCEDURE — 99285 EMERGENCY DEPT VISIT HI MDM: CPT

## 2021-07-24 PROCEDURE — 99223 1ST HOSP IP/OBS HIGH 75: CPT | Mod: GC

## 2021-07-24 PROCEDURE — 76870 US EXAM SCROTUM: CPT | Mod: 26

## 2021-07-24 RX ORDER — TRAMADOL HYDROCHLORIDE 50 MG/1
50 TABLET ORAL ONCE
Refills: 0 | Status: DISCONTINUED | OUTPATIENT
Start: 2021-07-24 | End: 2021-07-24

## 2021-07-24 RX ORDER — ESCITALOPRAM OXALATE 10 MG/1
1 TABLET, FILM COATED ORAL
Qty: 0 | Refills: 0 | DISCHARGE

## 2021-07-24 RX ORDER — KETOROLAC TROMETHAMINE 30 MG/ML
30 SYRINGE (ML) INJECTION ONCE
Refills: 0 | Status: DISCONTINUED | OUTPATIENT
Start: 2021-07-24 | End: 2021-07-24

## 2021-07-24 RX ORDER — CLONAZEPAM 1 MG
1 TABLET ORAL
Qty: 0 | Refills: 0 | DISCHARGE

## 2021-07-24 RX ORDER — EMTRICITABINE AND TENOFOVIR DISOPROXIL FUMARATE 200; 300 MG/1; MG/1
1 TABLET, FILM COATED ORAL
Qty: 0 | Refills: 0 | DISCHARGE

## 2021-07-24 RX ORDER — PANTOPRAZOLE SODIUM 20 MG/1
40 TABLET, DELAYED RELEASE ORAL EVERY 12 HOURS
Refills: 0 | Status: DISCONTINUED | OUTPATIENT
Start: 2021-07-25 | End: 2021-07-27

## 2021-07-24 RX ORDER — CLONAZEPAM 1 MG
1 TABLET ORAL EVERY 24 HOURS
Refills: 0 | Status: DISCONTINUED | OUTPATIENT
Start: 2021-07-24 | End: 2021-07-24

## 2021-07-24 RX ORDER — BUPROPION HYDROCHLORIDE 150 MG/1
150 TABLET, EXTENDED RELEASE ORAL
Qty: 0 | Refills: 0 | DISCHARGE

## 2021-07-24 RX ORDER — CLONAZEPAM 1 MG
1 TABLET ORAL EVERY 12 HOURS
Refills: 0 | Status: DISCONTINUED | OUTPATIENT
Start: 2021-07-24 | End: 2021-07-27

## 2021-07-24 RX ORDER — SODIUM CHLORIDE 9 MG/ML
1000 INJECTION INTRAMUSCULAR; INTRAVENOUS; SUBCUTANEOUS ONCE
Refills: 0 | Status: COMPLETED | OUTPATIENT
Start: 2021-07-24 | End: 2021-07-24

## 2021-07-24 RX ORDER — TRAZODONE HCL 50 MG
1 TABLET ORAL
Qty: 0 | Refills: 0 | DISCHARGE

## 2021-07-24 RX ORDER — CLONAZEPAM 1 MG
0 TABLET ORAL
Qty: 0 | Refills: 0 | DISCHARGE

## 2021-07-24 RX ORDER — KETOROLAC TROMETHAMINE 30 MG/ML
15 SYRINGE (ML) INJECTION EVERY 6 HOURS
Refills: 0 | Status: DISCONTINUED | OUTPATIENT
Start: 2021-07-24 | End: 2021-07-27

## 2021-07-24 RX ORDER — CEFTRIAXONE 500 MG/1
2000 INJECTION, POWDER, FOR SOLUTION INTRAMUSCULAR; INTRAVENOUS EVERY 24 HOURS
Refills: 0 | Status: DISCONTINUED | OUTPATIENT
Start: 2021-07-25 | End: 2021-07-27

## 2021-07-24 RX ORDER — CEFTRIAXONE 500 MG/1
1000 INJECTION, POWDER, FOR SOLUTION INTRAMUSCULAR; INTRAVENOUS EVERY 24 HOURS
Refills: 0 | Status: COMPLETED | OUTPATIENT
Start: 2021-07-24 | End: 2021-07-24

## 2021-07-24 RX ORDER — OMEPRAZOLE 10 MG/1
1 CAPSULE, DELAYED RELEASE ORAL
Qty: 0 | Refills: 0 | DISCHARGE

## 2021-07-24 RX ORDER — TRAZODONE HCL 50 MG
150 TABLET ORAL EVERY 24 HOURS
Refills: 0 | Status: DISCONTINUED | OUTPATIENT
Start: 2021-07-24 | End: 2021-07-27

## 2021-07-24 RX ORDER — KETOROLAC TROMETHAMINE 30 MG/ML
15 SYRINGE (ML) INJECTION EVERY 6 HOURS
Refills: 0 | Status: DISCONTINUED | OUTPATIENT
Start: 2021-07-24 | End: 2021-07-24

## 2021-07-24 RX ORDER — BUPROPION HYDROCHLORIDE 150 MG/1
150 TABLET, EXTENDED RELEASE ORAL EVERY 24 HOURS
Refills: 0 | Status: DISCONTINUED | OUTPATIENT
Start: 2021-07-25 | End: 2021-07-27

## 2021-07-24 RX ORDER — CEFTRIAXONE 500 MG/1
1000 INJECTION, POWDER, FOR SOLUTION INTRAMUSCULAR; INTRAVENOUS ONCE
Refills: 0 | Status: COMPLETED | OUTPATIENT
Start: 2021-07-24 | End: 2021-07-24

## 2021-07-24 RX ORDER — FINASTERIDE 5 MG/1
1 TABLET, FILM COATED ORAL
Qty: 0 | Refills: 0 | DISCHARGE

## 2021-07-24 RX ORDER — ACETAMINOPHEN 500 MG
650 TABLET ORAL EVERY 6 HOURS
Refills: 0 | Status: DISCONTINUED | OUTPATIENT
Start: 2021-07-24 | End: 2021-07-27

## 2021-07-24 RX ORDER — ESCITALOPRAM OXALATE 10 MG/1
20 TABLET, FILM COATED ORAL EVERY 24 HOURS
Refills: 0 | Status: DISCONTINUED | OUTPATIENT
Start: 2021-07-24 | End: 2021-07-27

## 2021-07-24 RX ADMIN — Medication 30 MILLIGRAM(S): at 16:41

## 2021-07-24 RX ADMIN — SODIUM CHLORIDE 1000 MILLILITER(S): 9 INJECTION INTRAMUSCULAR; INTRAVENOUS; SUBCUTANEOUS at 15:50

## 2021-07-24 RX ADMIN — TRAMADOL HYDROCHLORIDE 50 MILLIGRAM(S): 50 TABLET ORAL at 13:55

## 2021-07-24 RX ADMIN — CEFTRIAXONE 100 MILLIGRAM(S): 500 INJECTION, POWDER, FOR SOLUTION INTRAMUSCULAR; INTRAVENOUS at 16:11

## 2021-07-24 RX ADMIN — CEFTRIAXONE 100 MILLIGRAM(S): 500 INJECTION, POWDER, FOR SOLUTION INTRAMUSCULAR; INTRAVENOUS at 21:50

## 2021-07-24 RX ADMIN — ESCITALOPRAM OXALATE 20 MILLIGRAM(S): 10 TABLET, FILM COATED ORAL at 22:00

## 2021-07-24 RX ADMIN — SODIUM CHLORIDE 1000 MILLILITER(S): 9 INJECTION INTRAMUSCULAR; INTRAVENOUS; SUBCUTANEOUS at 13:56

## 2021-07-24 RX ADMIN — Medication 100 MILLIGRAM(S): at 16:19

## 2021-07-24 RX ADMIN — TRAMADOL HYDROCHLORIDE 50 MILLIGRAM(S): 50 TABLET ORAL at 14:50

## 2021-07-24 RX ADMIN — Medication 15 MILLIGRAM(S): at 22:08

## 2021-07-24 RX ADMIN — Medication 15 MILLIGRAM(S): at 22:38

## 2021-07-24 RX ADMIN — Medication 30 MILLIGRAM(S): at 16:11

## 2021-07-24 NOTE — ED PROVIDER NOTE - PROGRESS NOTE DETAILS
Uro consulted re: possible abscess on US w/ epididymoorchitis. Ceftriaxone + Doxy for UA / aforementioned. Pt reports US was painful and requested more analgesia. Pending Uro recommendations and eval PT seen and examined by Urology, images reviewed. No acute surgical intervention. Recommends admission to medicine for IV abx

## 2021-07-24 NOTE — ED ADULT TRIAGE NOTE - CHIEF COMPLAINT QUOTE
" I have fever and chills for the past week and testicular pain and L testicular swelling for 3 days".

## 2021-07-24 NOTE — CONSULT NOTE ADULT - SUBJECTIVE AND OBJECTIVE BOX
HPI: 50 yo with hx anxiety, depression, gonorrhea and cocaine use, on Truvada , presenting to ER for left scrotal swelling and pain that has worsening today.  He has history of UTI. He was at a recent sex party.  He is urinating well?  He was seen in April 2021 that he had     Patient states the swelling was worse this morning prompting him to go to his pmd who examined him and recommended he go to the ER.  Patient states he is recently  with his partner and has had unprotected sex with multiple male partners this past week. He denies any hematuria or penile discharge but admits to some discomfort with urinating. He denies any abd pain, nausea or vomiting, Denies any fever or chills    Vital Signs Last 24 Hrs  T(C): 36.6 (24 Jul 2021 13:10), Max: 36.6 (24 Jul 2021 13:10)  T(F): 97.8 (24 Jul 2021 13:10), Max: 97.8 (24 Jul 2021 13:10)  HR: 81 (24 Jul 2021 13:10) (81 - 81)  BP: 110/49 (24 Jul 2021 13:10) (110/49 - 110/49)  BP(mean): --  RR: 18 (24 Jul 2021 13:10) (18 - 18)  SpO2: 98% (24 Jul 2021 13:10) (98% - 98%)  I&O's Summary      PE:  Gen: NAD  Abd:  :  JOSHUA:    LABS:                        11.7   13.70 )-----------( 314      ( 24 Jul 2021 14:00 )             37.0     07-24    141  |  101  |  8   ----------------------------<  109<H>  4.0   |  32<H>  |  0.79    Ca    9.1      24 Jul 2021 14:00        Cultures      A/P    -US reviewed   -+UA, given Ceftriaxone in the ED HPI: 52 yo with hx anxiety, depression, gonorrhea and cocaine use, on Truvada , presenting to ER for left scrotal swelling and pain that has worsening today.  He has history of UTI. He was at a recent sex party.  He is urinating well?  He was seen in April 2021 that he had     Patient states the swelling was worse this morning prompting him to go to his pmd who examined him and recommended he go to the ER.  Patient states he is recently  with his partner and has had unprotected sex with multiple male partners this past week. He denies any hematuria or penile discharge but admits to some discomfort with urinating. He denies any abd pain, nausea or vomiting, Denies any fever or chills    Vital Signs Last 24 Hrs  T(C): 36.6 (24 Jul 2021 13:10), Max: 36.6 (24 Jul 2021 13:10)  T(F): 97.8 (24 Jul 2021 13:10), Max: 97.8 (24 Jul 2021 13:10)  HR: 81 (24 Jul 2021 13:10) (81 - 81)  BP: 110/49 (24 Jul 2021 13:10) (110/49 - 110/49)  BP(mean): --  RR: 18 (24 Jul 2021 13:10) (18 - 18)  SpO2: 98% (24 Jul 2021 13:10) (98% - 98%)  I&O's Summary      PE:  Gen: NAD  Abd:  :circumcised phallus non tender to palpation no blood or discharge from meatus, erythematous, edematous scrotum.     non tender right testicle, Enlarged hardened left testicle exquistely tender to palpation with overlying cellulitic skin, no areas of fluctuance or crepitus appreciated at this time***      LABS:                        11.7   13.70 )-----------( 314      ( 24 Jul 2021 14:00 )             37.0     07-24    141  |  101  |  8   ----------------------------<  109<H>  4.0   |  32<H>  |  0.79    Ca    9.1      24 Jul 2021 14:00        Cultures      A/P    -US reviewed   -+UA, given Ceftriaxone in the ED HPI: 50 yo with hx anxiety, depression, gonorrhea, h/o UTI, pyelonephritis and cocaine use, on Truvada presenting to ER for left scrotal swelling and pain that has worsening today. He was at a sex party last Thursday doesn't believe he had intercourse after this he didn't feel well. He wasn't sure if it was depression or an illness. On Thursday, he started having testicular pain and swelling. The pain worsened over the days and today was at its worst. He is taking Advil 4 tablets BID. He reports subjective fevers at home but has not taken a temp. He as well reports chills. He has a history of UTI that he reports is related to intercourse. He denies any hematuria or penile discharge but admits to some discomfort with urinating. He denies any abd pain, flank pain,  nausea or vomiting.     Vital Signs Last 24 Hrs  T(C): 36.6 (24 Jul 2021 13:10), Max: 36.6 (24 Jul 2021 13:10)  T(F): 97.8 (24 Jul 2021 13:10), Max: 97.8 (24 Jul 2021 13:10)  HR: 81 (24 Jul 2021 13:10) (81 - 81)  BP: 110/49 (24 Jul 2021 13:10) (110/49 - 110/49)  BP(mean): --  RR: 18 (24 Jul 2021 13:10) (18 - 18)  SpO2: 98% (24 Jul 2021 13:10) (98% - 98%)  I&O's Summary      PE:  Gen: NAD  Abd: soft, nt/nd   : circumcised phallus non tender to palpation no blood or discharge from meatus, erythematous, mild edematous scrotum. Non tender right testicle, enlarged hardened left testicle with epididymal tail edema/hardened, tender to palpation with overlying cellulitic skin, no areas of fluctuance or crepitus appreciated at this time  Back: No CVAT       LABS:                        11.7   13.70 )-----------( 314      ( 24 Jul 2021 14:00 )             37.0     07-24    141  |  101  |  8   ----------------------------<  109<H>  4.0   |  32<H>  |  0.79    Ca    9.1      24 Jul 2021 14:00      Cultures    A/P 50 yo with hx anxiety, depression, gonorrhea, h/o UTI, pyelonephritis and cocaine use, on Truvada presenting to ER for left scrotal swelling and pain that has worsening today. He reports subjective fevers at home and chills.  He had an US scrotal today that showed since 4/22/2021, there has been worsening of left epididymoorchitis. There is a new hypoechoic, avascular focus within the left epididymal tail which may represent abscess. WBC 13.70. VSS    -US reviewed with patient and compared to prior US from 4/2021   -(+)UA, given Ceftriaxone and Doxy in the ED  -GC/Chlamydia pending   -Will PVR patient to ensure complete emptying  -Consult medicine for admission for IV abx   -Monitor for fever, chills or changes in PE   -Counseled on dosage of Advil     Case discussed  residents  HPI: 52 yo with hx anxiety, depression, gonorrhea, h/o UTI, pyelonephritis and cocaine use, on Truvada presenting to ER for left scrotal swelling and pain that has worsening today. He was at a sex party last Thursday doesn't believe he had intercourse after this he didn't feel well. He wasn't sure if it was depression or an illness. On Thursday, he started having testicular pain and swelling. The pain worsened over the days and today was at its worst. He is taking Advil 4 tablets BID. He reports subjective fevers at home but has not taken a temp. He as well reports chills. He was seen for this in April of this year and finished his course of abx but did not follow up. He has a history of UTI that he reports is related to intercourse. He denies any hematuria or penile discharge but admits to some discomfort with urinating. He denies any abd pain, flank pain,  nausea or vomiting.     Vital Signs Last 24 Hrs  T(C): 36.6 (24 Jul 2021 13:10), Max: 36.6 (24 Jul 2021 13:10)  T(F): 97.8 (24 Jul 2021 13:10), Max: 97.8 (24 Jul 2021 13:10)  HR: 81 (24 Jul 2021 13:10) (81 - 81)  BP: 110/49 (24 Jul 2021 13:10) (110/49 - 110/49)  BP(mean): --  RR: 18 (24 Jul 2021 13:10) (18 - 18)  SpO2: 98% (24 Jul 2021 13:10) (98% - 98%)  I&O's Summary      PE:  Gen: NAD  Abd: soft, nt/nd   : circumcised phallus non tender to palpation no blood or discharge from meatus, erythematous, mild edematous scrotum. Non tender right testicle, enlarged hardened left testicle with epididymal tail edema/hardened, tender to palpation with overlying cellulitic skin, no areas of fluctuance or crepitus appreciated at this time  Back: No CVAT       LABS:                        11.7   13.70 )-----------( 314      ( 24 Jul 2021 14:00 )             37.0     07-24    141  |  101  |  8   ----------------------------<  109<H>  4.0   |  32<H>  |  0.79    Ca    9.1      24 Jul 2021 14:00      Cultures    A/P 52 yo with hx anxiety, depression, gonorrhea, h/o UTI, pyelonephritis and cocaine use, on Truvada presenting to ER for left scrotal swelling and pain that has worsening today. He reports subjective fevers at home and chills.  He had an US scrotal today that showed since 4/22/2021, there has been worsening of left epididymoorchitis. There is a new hypoechoic, avascular focus within the left epididymal tail which may represent abscess. WBC 13.70. VSS    -US reviewed with patient and compared to prior US from 4/2021   -(+)UA, given Ceftriaxone and Doxy in the ED  -GC/Chlamydia pending   -Will PVR patient to ensure complete emptying  -Consult medicine for admission for IV abx   -Monitor for fever, chills or changes in PE   -Counseled on dosage of Advil     Case discussed  residents

## 2021-07-24 NOTE — H&P ADULT - PROBLEM SELECTOR PLAN 6
F: none  E: Replete K<4, Mg<2  N: Regular  DVT PPX: SCDs  Dispo; RMF->Home history of anxiety  - continue home Klonopin 1mg PO BID PRN for anxiety history of depression  - continue home Lexapro 20mg daily and Wellbutrin 150mg daily  - continue home Trazadone 150mg at bedtime for sleep

## 2021-07-24 NOTE — H&P ADULT - PROBLEM SELECTOR PLAN 7
F: none  E: Replete K<4, Mg<2  N: Regular  DVT PPX: SCDs  Dispo; RMF->Home history of anxiety  - continue home Klonopin 1mg PO BID PRN for anxiety

## 2021-07-24 NOTE — H&P ADULT - ATTENDING COMMENTS
reviewed pertinent data , h&p  patient seen and examined overnight     PE as above except pt in pain at time of examination; pt w/ enlarged erythematous left scrotal region, palpation deferred 2/2 pt distress, findings per PGY3.     1. recurring epididymo-orchitis , started on higher dose of ceftriaxone, c/w doxycycline, followup GC/chlamydia, UCTX, urology recs; pain control prn        rest of  plan as above

## 2021-07-24 NOTE — H&P ADULT - NSHPPHYSICALEXAM_GEN_ALL_CORE
Vital Signs Last 12 Hrs  T(F): 98.2 (07-24-21 @ 17:59), Max: 98.2 (07-24-21 @ 17:59)  HR: 69 (07-24-21 @ 17:59) (69 - 81)  BP: 116/67 (07-24-21 @ 17:59) (110/49 - 116/67)  BP(mean): --  RR: 16 (07-24-21 @ 17:59) (16 - 18)  SpO2: 98% (07-24-21 @ 17:59) (98% - 98%)    PHYSICAL EXAM:  Constitutional: NAD, comfortable in bed.  HEENT: NC/AT, PERRLA, EOMI, no conjunctival pallor or scleral icterus, MMM  Neck: Supple, no JVD  Respiratory: CTA B/L. No w/r/r.   Cardiovascular: RRR, normal S1 and S2, no m/r/g.   Gastrointestinal: +BS, soft NTND, no guarding or rebound tenderness, no palpable masses   : tender to palpation, enlarged, and hardened left testicle descending down to the epididymal tail. Cellulitic skin present on left testicle. Nontender right testicle. Circumcised phallus with no pain, blood, or discharge from meatus, erythematous. No areas of fluctuance or crepitus appreciated at this time  Extremities: wwp; no cyanosis, clubbing or edema.   Vascular: Pulses equal and strong throughout.   Neurological: AAOx3, no CN deficits, strength and sensation intact throughout.   Skin: No gross skin abnormalities or rashes

## 2021-07-24 NOTE — ED PROVIDER NOTE - PHYSICAL EXAMINATION
Constitutional: Well appearing, well nourished, awake, alert, oriented to person, place, time/situation and in no apparent distress.  ENMT: Airway patent.   Eyes: Clear bilaterally  Cardiac: Normal rate, regular rhythm.   Respiratory: No increased WOB, tachypnea, hypoxia, or accessory mm use. Pt speaks in full sentences.   Gastrointestinal: Abd soft, NT, ND. No guarding, rebound, or rigidity.   : circumcised. no penile discharge or lesions. + L testicular / scrotal swelling, mild erythema and some ttp. normal testicular lie.   Musculoskeletal: Range of motion is not limited  Neuro: Alert and oriented x 3, face symmetric and speech fluent. Nml gross motor movement, grossly non focal   Skin: Skin normal color for race, warm, dry and intact. No evidence of rash.  Psych: Alert and oriented to person, place, time/situation. normal mood and affect. no apparent risk to self or others.

## 2021-07-24 NOTE — H&P ADULT - PROBLEM SELECTOR PLAN 4
history of depression  - continue home Lexapro 20mg daily and Wellbutrin 150mg daily  - continue home Trazadone 150mg at bedtime for sleep on admission, hb 11.7 (hb 14.4, 3 months ago)  - no signs and symptoms of bleeding on exam  - f/u B12 and folate  - f/u iron panel  - qualifies for outpatient colonoscopy screening  - maintain active type and screens ADDENDUM: on truvada, encourage safe sex practices

## 2021-07-24 NOTE — H&P ADULT - PROBLEM SELECTOR PLAN 5
history of anxiety  - continue home Klonopin 1mg PO BID PRN for anxiety history of depression  - continue home Lexapro 20mg daily and Wellbutrin 150mg daily  - continue home Trazadone 150mg at bedtime for sleep history of GERD  - continue with home omeprazole 20mg BID

## 2021-07-24 NOTE — H&P ADULT - HISTORY OF PRESENT ILLNESS
51M PMH depression/anxiety, gonorrhea, frequent UTIs complicated by pyelonephritis (5 years ago) s/p urethral dilatation for stricture as a child, MSM (on Truvada) presenting with subjective fevers, fatigue, generalized weakness, suprapubic pain and left scrotal pain and swelling for the past 6 days. He notes that Of note, he was previously admitted in 4/2021 for left epididymoorchitis with pyocele and treated with antibiotics but never followed up with urology.     He was at a sex party last Thursday doesn't believe he had intercourse after this he didn't feel well. He wasn't sure if it was depression or an illness. On Thursday, he started having testicular pain and swelling. The pain worsened over the days and today was at its worst. He is taking Advil 4 tablets BID. He reports subjective fevers at home but has not taken a temp. He as well reports chills. He was seen for this in April of this year and finished his course of abx but did not follow up. He has a history of UTI that he reports is related to intercourse. He denies any hematuria or penile discharge but admits to some discomfort with urinating. He denies any abd pain, flank pain,  nausea or vomiting.     ED Course:  Vitals: Tmax 98.2F oral, HR 81, /49, RR 18, spO2 98% room air  Labs: wbc 13.7, hb 11.7, mcv 91.4, plts 314, Na 141, K 4.0, Cl 101, CO2 32, BUN/Cr 8/0.79, glucose 109, Calcium 9.1, UA + LE, nitrites, bacteria, blood, RBC  Imaging:   US Testicles: Since 4/22/2021, there has been worsening of left epididymoorchitis. There is a new hypoechoic, avascular focus within the left epididymal tail which may represent abscess. Improvement left-sided pyocele. Small right hydrocele.  Interventions: Ceftriaxone 1g IV, Doxycycline 100mg PO, Toradol 30mg IV, Tramadol 50mg, NS 1L 51M PMH depression/anxiety, gonorrhea, frequent UTIs complicated by pyelonephritis (5 years ago) s/p urethral dilatation for stricture as a child, MSM (on Truvada) presenting with subjective fevers, fatigue, generalized weakness, suprapubic pain and left scrotal pain and swelling for the past 6 days. He says his left testicular swelling began a few days ago associated with left testicular hardness and tenderness on palpation with associated erythema and warmth. He denies any trauma to the area. He denies improvement on elevation. He denies any penile pain, skin color changes, or discharge. He admits to dysuria, and suprapubic pain but denies any frequency, urgency, or hematuria He says his right scrotum is soft and not painful. He has been taking ibuprofen for his symptoms with mild relief. He reports history of frequent UTIs with hospitalization for pyelonephritis 5 years ago. He notes that he was at a party last week and engaged in insertive, unprotected sex with 3 other men. He takes Truvada and says he tests negative for HIV. He endorses a history of gonorrhea which was treated a few months ago. Of note, he was previously admitted in 4/2021 for left epididymoorchitis with pyocele and treated with antibiotics but never followed up with urology. Otherwise, he denies any cough, chest pain, shortness of breath, abdominal pain, n/v/d/c, lower extremity edema, or changes in bowel habits. He denies smoking, alcohol, or drug use.     ED Course:  Vitals: Tmax 98.2F oral, HR 81, /49, RR 18, spO2 98% room air  Labs: wbc 13.7, hb 11.7, mcv 91.4, plts 314, Na 141, K 4.0, Cl 101, CO2 32, BUN/Cr 8/0.79, glucose 109, Calcium 9.1, UA + LE, nitrites, bacteria, blood, RBC  Imaging:   US Testicles: Since 4/22/2021, there has been worsening of left epididymoorchitis. There is a new hypoechoic, avascular focus within the left epididymal tail which may represent abscess. Improvement left-sided pyocele. Small right hydrocele.  Interventions: Ceftriaxone 1g IV, Doxycycline 100mg PO, Toradol 30mg IV, Tramadol 50mg, NS 1L

## 2021-07-24 NOTE — H&P ADULT - NSICDXPASTMEDICALHX_GEN_ALL_CORE_FT
PAST MEDICAL HISTORY:  Anxiety     Depression     Frequent UTI     GERD (gastroesophageal reflux disease)     H/O urethral stricture     History of gonorrhea

## 2021-07-24 NOTE — ED PROVIDER NOTE - NS ED ROS FT
Constitutional: Tactile temp, temp not checked  Eyes: No pain, blurry vision, or discharge.  ENMT: No hearing changes, pain, discharge or infections. No neck pain or stiffness.  Cardiac: No chest pain, SOB or edema. No chest pain with exertion.  Respiratory: No cough or respiratory distress. No hemoptysis. No history of asthma or RAD.  GI: No nausea, vomiting, diarrhea or abdominal pain.  : See HPI  MS: No myalgia, muscle weakness, joint pain or back pain.  Neuro: No headache or weakness. No LOC.  Skin: No skin rash.   Endocrine: No history of thyroid disease or diabetes.  Except as documented in the HPI, all other systems are negative.

## 2021-07-24 NOTE — H&P ADULT - PROBLEM SELECTOR PLAN 2
Admits to frequent UTI with now worsening left epididymoorchitis. Endorses surgery for urethral strictures as a child for reasons for his frequent UTIs. Engages in unsafe, unprotected sexual practices with multiple male partners  - counseled on unsafe sexual practices  - continue abx to treat left epididymoorchitis as above  - urology following Admits to frequent UTI with now worsening left epididymoorchitis. Endorses surgery for urethral strictures as a child for reasons for his frequent UTIs. Engages in unsafe, unprotected sexual practices with multiple male partners  - counseled on unsafe sexual practices  - continue abx to treat left epididymoorchitis as above  - urology following  - f/u urine cultures Admits to frequent UTI with now worsening left epididymoorchitis. Endorses surgery for urethral strictures as a child for reasons for his frequent UTIs. Engages in unsafe, unprotected sexual practices with multiple male partners  - counseled on unsafe sexual practices  - continue abx to treat left epididymoorchitis and UTI as above  - urology following  - f/u urine cultures

## 2021-07-24 NOTE — H&P ADULT - NSHPLABSRESULTS_GEN_ALL_CORE
LABS:                         11.7   13.70 )-----------( 314      ( 2021 14:00 )             37.0     07-24    141  |  101  |  8   ----------------------------<  109<H>  4.0   |  32<H>  |  0.79    Ca    9.1      2021 14:00        Urinalysis Basic - ( 2021 14:09 )    Color: Yellow / Appearance: SL Cloudy / S.020 / pH: x  Gluc: x / Ketone: NEGATIVE  / Bili: Small / Urobili: 2.0 E.U./dL   Blood: x / Protein: 100 mg/dL / Nitrite: POSITIVE   Leuk Esterase: Moderate / RBC: < 5 /HPF / WBC Many /HPF   Sq Epi: x / Non Sq Epi: 0-5 /HPF / Bacteria: Many /HPF                RADIOLOGY, EKG & ADDITIONAL TESTS:

## 2021-07-24 NOTE — H&P ADULT - PROBLEM SELECTOR PLAN 1
presenting with subjective fevers, fatigue, generalized weakness, suprapubic pain and left scrotal pain and swelling for the past 6 days.   - Afebrile on presentation, VSS. WBC 13.70  - on exam left testicle with tenderness to palpation, enlarged, and hardened descending down to the epididymal tail. Cellulitic skin present on left testicle.  - US testicles worsening of left epididymoorchitis since 4/2021. There is a new hypoechoic, avascular focus within the left epididymal tail which may represent abscess. Improvement left-sided pyocele. Small right hydrocele  - HIV negative  - continue Ceftriaxone 1g IV and doxycyline 100mg PO BID for 10 days  - f/u gonorrhea/chlamydia  - presenting with subjective fevers, fatigue, generalized weakness, suprapubic pain and left scrotal pain and swelling for the past 6 days.   - Afebrile on presentation, VSS. WBC 13.70  - on exam left testicle with tenderness to palpation, enlarged, and hardened descending down to the epididymal tail. Cellulitic skin present on left testicle.  - US testicles worsening of left epididymoorchitis since 4/2021. There is a new hypoechoic, avascular focus within the left epididymal tail which may represent abscess. Improvement left-sided pyocele. Small right hydrocele  - HIV negative  - continue Ceftriaxone 1g IV and doxycyline 100mg PO BID for 10 days  - f/u gonorrhea/chlamydia  - pain control with Tylenol (mild) and Toradol (moderate)  - f/u blood cultures and urine cultures presenting with subjective fevers, fatigue, generalized weakness, suprapubic pain and left scrotal pain and swelling for the past 6 days.   - Afebrile on presentation, VSS. WBC 13.70  - on exam left testicle with tenderness to palpation, enlarged, and hardened descending down to the epididymal tail. Cellulitic skin present on left testicle.  - US testicles worsening of left epididymoorchitis since 4/2021. There is a new hypoechoic, avascular focus within the left epididymal tail which may represent abscess. Improvement left-sided pyocele. Small right hydrocele  - urology following, abscess too small for drainage  - HIV negative  - continue doxycyline 100mg PO BID for 10 days  - continue ceftriaxone 2g daily to cover for presumptive pyelo  - f/u gonorrhea/chlamydia  - pain control with Tylenol (mild) and Toradol (moderate)  - f/u blood cultures and urine cultures presenting with subjective fevers, fatigue, generalized weakness, suprapubic pain and left scrotal pain and swelling for the past 6 days.   - Afebrile on presentation, VSS. WBC 13.70  - on exam left testicle with tenderness to palpation, enlarged, and hardened descending down to the epididymal tail. Cellulitic skin present on left testicle.  - US testicles worsening of left epididymoorchitis since 4/2021. There is a new hypoechoic, avascular focus within the left epididymal tail which may represent abscess. Improvement left-sided pyocele. Small right hydrocele  - urology following, abscess too small for drainage  - HIV negative  - continue doxycyline 100mg PO BID for 10 days  - continue ceftriaxone 2g daily to cover for presumptive pyelo  - f/u gonorrhea/chlamydia  - pain control with Tylenol (mild) and Toradol (moderate)  - f/u blood cultures and urine cultures    ADDENDUM: started on 2g ceftriaxone given severity of symptoms

## 2021-07-24 NOTE — ED PROVIDER NOTE - OBJECTIVE STATEMENT
Pt w/ PMHx anxiety / depression / PTSD, MSM on Truvada for prep, + hx gonorrhea, hx L epididymo-orchitis w/ pyocele 3 months ago tx'd w/ abx but did not f/u w/ Urology, hx frequent UTI s/p urethral dilatation for stricture as a child now p/w L testicular pain, onset 1-2 days ago, atraumatic, worsened today, precipitated by 2-3 days of gen malaise, myalgias s/p sex party 1 week ago w/ multiple partners. No trauma.

## 2021-07-24 NOTE — ED ADULT NURSE REASSESSMENT NOTE - NS ED NURSE REASSESS COMMENT FT1
Pt was seen by urology, had bladder scan after urinating 550ml and only 30ml of residual. Pt is also being seen by medicine team at bedside and made aware by ER attending that he is pending admission.

## 2021-07-24 NOTE — ED PROVIDER NOTE - CLINICAL SUMMARY MEDICAL DECISION MAKING FREE TEXT BOX
Pt p/w L testicular pain / swelling s/p sex party, hx prior epididymoorchitis w/ similar presentation, high suspicion recurrence. Low suspicion torsion based on H&P. CHeck labs, STD testing, US, analgesia. Dispo pending w/u and clinical status

## 2021-07-24 NOTE — H&P ADULT - PROBLEM SELECTOR PLAN 3
history of GERD  - continue with home omeprazole 20mg BID on admission, hb 11.7 (hb 14.4, 3 months ago)  - no signs and symptoms of bleeding on exam  - f/u B12 and folate  - f/u iron panel  - qualifies for outpatient colonoscopy screening  - maintain active type and screens

## 2021-07-25 DIAGNOSIS — Z72.52 HIGH RISK HOMOSEXUAL BEHAVIOR: ICD-10-CM

## 2021-07-25 LAB
ALBUMIN SERPL ELPH-MCNC: 3.2 G/DL — LOW (ref 3.3–5)
ALP SERPL-CCNC: 99 U/L — SIGNIFICANT CHANGE UP (ref 40–120)
ALT FLD-CCNC: 11 U/L — SIGNIFICANT CHANGE UP (ref 10–45)
ANION GAP SERPL CALC-SCNC: 9 MMOL/L — SIGNIFICANT CHANGE UP (ref 5–17)
AST SERPL-CCNC: 14 U/L — SIGNIFICANT CHANGE UP (ref 10–40)
BASOPHILS # BLD AUTO: 0.04 K/UL — SIGNIFICANT CHANGE UP (ref 0–0.2)
BASOPHILS NFR BLD AUTO: 0.3 % — SIGNIFICANT CHANGE UP (ref 0–2)
BILIRUB SERPL-MCNC: 0.2 MG/DL — SIGNIFICANT CHANGE UP (ref 0.2–1.2)
BUN SERPL-MCNC: 9 MG/DL — SIGNIFICANT CHANGE UP (ref 7–23)
CALCIUM SERPL-MCNC: 8.8 MG/DL — SIGNIFICANT CHANGE UP (ref 8.4–10.5)
CHLORIDE SERPL-SCNC: 105 MMOL/L — SIGNIFICANT CHANGE UP (ref 96–108)
CO2 SERPL-SCNC: 29 MMOL/L — SIGNIFICANT CHANGE UP (ref 22–31)
COVID-19 SPIKE DOMAIN AB INTERP: POSITIVE
COVID-19 SPIKE DOMAIN ANTIBODY RESULT: 161 U/ML — HIGH
CREAT SERPL-MCNC: 0.75 MG/DL — SIGNIFICANT CHANGE UP (ref 0.5–1.3)
EOSINOPHIL # BLD AUTO: 0.23 K/UL — SIGNIFICANT CHANGE UP (ref 0–0.5)
EOSINOPHIL NFR BLD AUTO: 2 % — SIGNIFICANT CHANGE UP (ref 0–6)
FERRITIN SERPL-MCNC: 80 NG/ML — SIGNIFICANT CHANGE UP (ref 30–400)
FOLATE SERPL-MCNC: 9.2 NG/ML — SIGNIFICANT CHANGE UP
GLUCOSE SERPL-MCNC: 86 MG/DL — SIGNIFICANT CHANGE UP (ref 70–99)
HCT VFR BLD CALC: 33.7 % — LOW (ref 39–50)
HGB BLD-MCNC: 10.6 G/DL — LOW (ref 13–17)
IMM GRANULOCYTES NFR BLD AUTO: 0.6 % — SIGNIFICANT CHANGE UP (ref 0–1.5)
IRON SATN MFR SERPL: 13 % — LOW (ref 16–55)
IRON SATN MFR SERPL: 33 UG/DL — LOW (ref 45–165)
LYMPHOCYTES # BLD AUTO: 1.71 K/UL — SIGNIFICANT CHANGE UP (ref 1–3.3)
LYMPHOCYTES # BLD AUTO: 14.7 % — SIGNIFICANT CHANGE UP (ref 13–44)
MAGNESIUM SERPL-MCNC: 1.9 MG/DL — SIGNIFICANT CHANGE UP (ref 1.6–2.6)
MCHC RBC-ENTMCNC: 28.7 PG — SIGNIFICANT CHANGE UP (ref 27–34)
MCHC RBC-ENTMCNC: 31.5 GM/DL — LOW (ref 32–36)
MCV RBC AUTO: 91.3 FL — SIGNIFICANT CHANGE UP (ref 80–100)
MONOCYTES # BLD AUTO: 0.9 K/UL — SIGNIFICANT CHANGE UP (ref 0–0.9)
MONOCYTES NFR BLD AUTO: 7.8 % — SIGNIFICANT CHANGE UP (ref 2–14)
NEUTROPHILS # BLD AUTO: 8.66 K/UL — HIGH (ref 1.8–7.4)
NEUTROPHILS NFR BLD AUTO: 74.6 % — SIGNIFICANT CHANGE UP (ref 43–77)
NRBC # BLD: 0 /100 WBCS — SIGNIFICANT CHANGE UP (ref 0–0)
PHOSPHATE SERPL-MCNC: 4.6 MG/DL — HIGH (ref 2.5–4.5)
PLATELET # BLD AUTO: 283 K/UL — SIGNIFICANT CHANGE UP (ref 150–400)
POTASSIUM SERPL-MCNC: 3.9 MMOL/L — SIGNIFICANT CHANGE UP (ref 3.5–5.3)
POTASSIUM SERPL-SCNC: 3.9 MMOL/L — SIGNIFICANT CHANGE UP (ref 3.5–5.3)
PROT SERPL-MCNC: 6.1 G/DL — SIGNIFICANT CHANGE UP (ref 6–8.3)
RBC # BLD: 3.69 M/UL — LOW (ref 4.2–5.8)
RBC # FLD: 13.4 % — SIGNIFICANT CHANGE UP (ref 10.3–14.5)
SARS-COV-2 IGG+IGM SERPL QL IA: 161 U/ML — HIGH
SARS-COV-2 IGG+IGM SERPL QL IA: POSITIVE
SODIUM SERPL-SCNC: 143 MMOL/L — SIGNIFICANT CHANGE UP (ref 135–145)
TIBC SERPL-MCNC: 247 UG/DL — SIGNIFICANT CHANGE UP (ref 220–430)
UIBC SERPL-MCNC: 214 UG/DL — SIGNIFICANT CHANGE UP (ref 110–370)
VIT B12 SERPL-MCNC: 448 PG/ML — SIGNIFICANT CHANGE UP (ref 232–1245)
WBC # BLD: 11.61 K/UL — HIGH (ref 3.8–10.5)
WBC # FLD AUTO: 11.61 K/UL — HIGH (ref 3.8–10.5)

## 2021-07-25 PROCEDURE — 99233 SBSQ HOSP IP/OBS HIGH 50: CPT | Mod: GC

## 2021-07-25 RX ORDER — HYDROMORPHONE HYDROCHLORIDE 2 MG/ML
1 INJECTION INTRAMUSCULAR; INTRAVENOUS; SUBCUTANEOUS ONCE
Refills: 0 | Status: DISCONTINUED | OUTPATIENT
Start: 2021-07-25 | End: 2021-07-25

## 2021-07-25 RX ADMIN — HYDROMORPHONE HYDROCHLORIDE 1 MILLIGRAM(S): 2 INJECTION INTRAMUSCULAR; INTRAVENOUS; SUBCUTANEOUS at 04:26

## 2021-07-25 RX ADMIN — Medication 100 MILLIGRAM(S): at 06:29

## 2021-07-25 RX ADMIN — BUPROPION HYDROCHLORIDE 150 MILLIGRAM(S): 150 TABLET, EXTENDED RELEASE ORAL at 10:27

## 2021-07-25 RX ADMIN — Medication 15 MILLIGRAM(S): at 22:07

## 2021-07-25 RX ADMIN — Medication 150 MILLIGRAM(S): at 00:32

## 2021-07-25 RX ADMIN — Medication 15 MILLIGRAM(S): at 22:45

## 2021-07-25 RX ADMIN — Medication 15 MILLIGRAM(S): at 12:58

## 2021-07-25 RX ADMIN — Medication 100 MILLIGRAM(S): at 17:07

## 2021-07-25 RX ADMIN — PANTOPRAZOLE SODIUM 40 MILLIGRAM(S): 20 TABLET, DELAYED RELEASE ORAL at 17:07

## 2021-07-25 RX ADMIN — PANTOPRAZOLE SODIUM 40 MILLIGRAM(S): 20 TABLET, DELAYED RELEASE ORAL at 06:29

## 2021-07-25 RX ADMIN — CEFTRIAXONE 100 MILLIGRAM(S): 500 INJECTION, POWDER, FOR SOLUTION INTRAMUSCULAR; INTRAVENOUS at 22:07

## 2021-07-25 RX ADMIN — HYDROMORPHONE HYDROCHLORIDE 1 MILLIGRAM(S): 2 INJECTION INTRAMUSCULAR; INTRAVENOUS; SUBCUTANEOUS at 05:48

## 2021-07-25 RX ADMIN — Medication 15 MILLIGRAM(S): at 13:58

## 2021-07-25 RX ADMIN — Medication 15 MILLIGRAM(S): at 05:48

## 2021-07-25 RX ADMIN — Medication 15 MILLIGRAM(S): at 04:12

## 2021-07-25 RX ADMIN — ESCITALOPRAM OXALATE 20 MILLIGRAM(S): 10 TABLET, FILM COATED ORAL at 22:08

## 2021-07-25 NOTE — CHART NOTE - NSCHARTNOTEFT_GEN_A_CORE
Patient Name: Anuel Peterson Date: 1970  Address: [facility] [address] [place], [place] [address]Dom: Male  Rx Written	Rx Dispensed	Drug	Quantity	Days Supply	Prescriber Name	Prescriber Alice #	Payment Method	Dispenser  06/28/2021	06/30/2021	clonazepam 1 mg tablet	60	30	Acquista, Hardy	[id number]	Insurance	[facility]  05/17/2021	05/21/2021	clonazepam 1 mg tablet	60	30	Constantin Gay (NP)	[id number]	Insurance	[facility]  04/22/2021	04/22/2021	hydrocodone-acetaminophen 5-325 mg tablet	12	3	Stanley Pena MD	[id number]	Insurance	[facility]  04/01/2021	04/02/2021	clonazepam 1 mg tablet	60	30	Acquista, Hardy	[id number]	Insurance	[facility]  02/03/2021	02/04/2021	clonazepam 1 mg tablet	60	30	Constantin Gay (JAS)	[id number]	Insurance	[facility]  12/23/2020	12/31/2020	clonazepam 1 mg tablet	60	30	Acquista, Hardy	[id number]	Insurance	[facility]  11/20/2020	11/21/2020	clonazepam 1 mg tablet	60	30	Acquista, Hardy	[id number]	Insurance	[facility]  10/20/2020	10/23/2020	clonazepam 1 mg tablet	60	30	Acquista, Hardy	[id number]	Insurance	[facility]  07/30/2020	08/15/2020	clonazepam 1 mg tablet	60	30	Acquista, Hardy	[id number]	Insurance	[facility]

## 2021-07-25 NOTE — PROGRESS NOTE ADULT - PROBLEM SELECTOR PLAN 6
history of depression  - continue home Lexapro 20mg daily and Wellbutrin 150mg daily  - continue home Trazadone 150mg at bedtime for sleep - continue home Lexapro 20mg daily and Wellbutrin 150mg daily  - continue home Trazadone 150mg at bedtime for sleep

## 2021-07-25 NOTE — PROGRESS NOTE ADULT - PROBLEM SELECTOR PLAN 1
presenting with subjective fevers, fatigue, generalized weakness, suprapubic pain and left scrotal pain and swelling for the past 6 days.   - Afebrile on presentation, VSS. WBC 13.70  - on exam left testicle with tenderness to palpation, enlarged, and hardened descending down to the epididymal tail. Cellulitic skin present on left testicle.  - US testicles worsening of left epididymoorchitis since 4/2021. There is a new hypoechoic, avascular focus within the left epididymal tail which may represent abscess. Improvement left-sided pyocele. Small right hydrocele  - urology following, abscess too small for drainage  - HIV negative  - continue doxycyline 100mg PO BID for 10 days  - continue ceftriaxone 2g daily to cover for presumptive pyelo  - f/u gonorrhea/chlamydia  - pain control with Tylenol (mild) and Toradol (moderate)  - f/u blood cultures and urine cultures    ADDENDUM: started on 2g ceftriaxone given severity of symptoms - urology following, abscess too small for drainage  - HIV screen -> negative  - continue doxycyline 100mg PO BID for 10 days  - continue ceftriaxone 2g daily to cover for presumptive pyelo  - f/u gonorrhea/chlamydia  - pain control with Tylenol (mild) and Toradol (moderate)  - f/u blood cultures and urine cultures

## 2021-07-25 NOTE — PROGRESS NOTE ADULT - SUBJECTIVE AND OBJECTIVE BOX
SUBJECTIVE: Afebrile, VSS.     cefTRIAXone   IVPB 2000 milliGRAM(s) IV Intermittent every 24 hours  doxycycline hyclate Capsule 100 milliGRAM(s) Oral every 12 hours      Vital Signs Last 24 Hrs  T(C): 36.7 (2021 06:12), Max: 37.4 (2021 22:26)  T(F): 98.1 (2021 06:12), Max: 99.3 (2021 22:26)  HR: 70 (2021 06:12) (69 - 81)  BP: 109/69 (2021 06:12) (109/69 - 134/80)  BP(mean): --  RR: 17 (2021 06:12) (16 - 18)  SpO2: 95% (2021 06:12) (95% - 98%)  I&O's Detail      Physical Exam:  General: No acute distress, resting comfortably in bed  Pulm: Nonlabored breathing, no respiratory distress  : L testicle swollen, no erythema, no crepitus, diffusely TTP. R testicular exam normal. No penile discharge or tenderness on palpation.      LABS:                        10.6   11.61 )-----------( 283      ( 2021 06:33 )             33.7     07-24    141  |  101  |  8   ----------------------------<  109<H>  4.0   |  32<H>  |  0.79    Ca    9.1      2021 14:00        Urinalysis Basic - ( 2021 14:09 )    Color: Yellow / Appearance: SL Cloudy / S.020 / pH: x  Gluc: x / Ketone: NEGATIVE  / Bili: Small / Urobili: 2.0 E.U./dL   Blood: x / Protein: 100 mg/dL / Nitrite: POSITIVE   Leuk Esterase: Moderate / RBC: < 5 /HPF / WBC Many /HPF   Sq Epi: x / Non Sq Epi: 0-5 /HPF / Bacteria: Many /HPF        RADIOLOGY & ADDITIONAL STUDIES:

## 2021-07-25 NOTE — PROGRESS NOTE ADULT - PROBLEM SELECTOR PLAN 3
on admission, hb 11.7 (hb 14.4, 3 months ago)  - no signs and symptoms of bleeding on exam  - f/u B12 and folate  - f/u iron panel  - qualifies for outpatient colonoscopy screening  - maintain active type and screens

## 2021-07-25 NOTE — PROGRESS NOTE ADULT - PROBLEM SELECTOR PLAN 7
history of anxiety  - continue home Klonopin 1mg PO BID PRN for anxiety - continue home Klonopin 1mg PO BID PRN for anxiety

## 2021-07-25 NOTE — PROGRESS NOTE ADULT - SUBJECTIVE AND OBJECTIVE BOX
O/N Events:    Subjective/ROS: Patient seen and examined at bedside.     Denies Fever/Chills, HA, CP, SOB, n/v, changes in bowel/urinary habits.  12pt ROS otherwise negative.    VITALS  Vital Signs Last 24 Hrs  T(C): 36.7 (2021 06:12), Max: 37.4 (2021 22:26)  T(F): 98.1 (2021 06:12), Max: 99.3 (2021 22:26)  HR: 70 (2021 06:12) (69 - 81)  BP: 109/69 (2021 06:12) (109/69 - 134/80)  BP(mean): --  RR: 17 (2021 06:12) (16 - 18)  SpO2: 95% (2021 06:12) (95% - 98%)    CAPILLARY BLOOD GLUCOSE          PHYSICAL EXAM  General: NAD  Head: NC/AT; MMM; PERRL; EOMI;  Neck: Supple; no JVD  Respiratory: CTAB; no wheezes/rales/rhonchi  Cardiovascular: Regular rhythm/rate; S1/S2+, no murmurs, rubs gallops   Gastrointestinal: Soft; NTND; bowel sounds normal and present  Extremities: WWP; no edema/cyanosis  Neurological: A&Ox3, CNII-XII grossly intact; no obvious focal deficits    MEDICATIONS  (STANDING):  buPROPion XL (24-Hour) . 150 milliGRAM(s) Oral every 24 hours  cefTRIAXone   IVPB 2000 milliGRAM(s) IV Intermittent every 24 hours  doxycycline hyclate Capsule 100 milliGRAM(s) Oral every 12 hours  escitalopram 20 milliGRAM(s) Oral every 24 hours  pantoprazole    Tablet 40 milliGRAM(s) Oral every 12 hours  traZODone 150 milliGRAM(s) Oral every 24 hours    MEDICATIONS  (PRN):  acetaminophen   Tablet .. 650 milliGRAM(s) Oral every 6 hours PRN Temp greater or equal to 38C (100.4F), Mild Pain (1 - 3)  clonazePAM  Tablet 1 milliGRAM(s) Oral every 12 hours PRN anxiety  ketorolac   Injectable 15 milliGRAM(s) IV Push every 6 hours PRN Moderate Pain (4 - 6)      No Known Allergies      LABS                        10.6   11.61 )-----------( 283      ( 2021 06:33 )             33.7         143  |  105  |  9   ----------------------------<  86  3.9   |  29  |  0.75    Ca    8.8      2021 06:33  Phos  4.6       Mg     1.9         TPro  6.1  /  Alb  3.2<L>  /  TBili  0.2  /  DBili  x   /  AST  14  /  ALT  11  /  AlkPhos  99        Urinalysis Basic - ( 2021 14:09 )    Color: Yellow / Appearance: SL Cloudy / S.020 / pH: x  Gluc: x / Ketone: NEGATIVE  / Bili: Small / Urobili: 2.0 E.U./dL   Blood: x / Protein: 100 mg/dL / Nitrite: POSITIVE   Leuk Esterase: Moderate / RBC: < 5 /HPF / WBC Many /HPF   Sq Epi: x / Non Sq Epi: 0-5 /HPF / Bacteria: Many /HPF            Culture - Urine (collected 21 @ 15:36)  Source: Clean Catch Clean Catch (Midstream)  Preliminary Report (21 @ 12:20):    >100,000 CFU/ml Klebsiella pneumoniae    Susceptibility to follow.        IMAGING/EKG/ETC   O/N Events: admitted to the team    Subjective/ROS: Patient seen and examined at bedside.     Denies Fever/Chills, HA, CP, SOB, n/v, changes in bowel/urinary habits.  12pt ROS otherwise negative.    VITALS  Vital Signs Last 24 Hrs  T(C): 36.7 (2021 06:12), Max: 37.4 (2021 22:26)  T(F): 98.1 (2021 06:12), Max: 99.3 (2021 22:26)  HR: 70 (2021 06:12) (69 - 81)  BP: 109/69 (2021 06:12) (109/69 - 134/80)  BP(mean): --  RR: 17 (2021 06:12) (16 - 18)  SpO2: 95% (2021 06:12) (95% - 98%)    CAPILLARY BLOOD GLUCOSE          PHYSICAL EXAM  General: NAD  Head: NC/AT; MMM; PERRL; EOMI;  Neck: Supple; no JVD  Respiratory: CTAB; no wheezes/rales/rhonchi  Cardiovascular: Regular rhythm/rate; S1/S2+, no murmurs, rubs gallops   Gastrointestinal: Soft; NTND; bowel sounds normal and present  Extremities: WWP; no edema/cyanosis  Neurological: A&Ox3, CNII-XII grossly intact; no obvious focal deficits    MEDICATIONS  (STANDING):  buPROPion XL (24-Hour) . 150 milliGRAM(s) Oral every 24 hours  cefTRIAXone   IVPB 2000 milliGRAM(s) IV Intermittent every 24 hours  doxycycline hyclate Capsule 100 milliGRAM(s) Oral every 12 hours  escitalopram 20 milliGRAM(s) Oral every 24 hours  pantoprazole    Tablet 40 milliGRAM(s) Oral every 12 hours  traZODone 150 milliGRAM(s) Oral every 24 hours    MEDICATIONS  (PRN):  acetaminophen   Tablet .. 650 milliGRAM(s) Oral every 6 hours PRN Temp greater or equal to 38C (100.4F), Mild Pain (1 - 3)  clonazePAM  Tablet 1 milliGRAM(s) Oral every 12 hours PRN anxiety  ketorolac   Injectable 15 milliGRAM(s) IV Push every 6 hours PRN Moderate Pain (4 - 6)      No Known Allergies      LABS                        10.6   11.61 )-----------( 283      ( 2021 06:33 )             33.7         143  |  105  |  9   ----------------------------<  86  3.9   |  29  |  0.75    Ca    8.8      2021 06:33  Phos  4.6       Mg     1.9         TPro  6.1  /  Alb  3.2<L>  /  TBili  0.2  /  DBili  x   /  AST  14  /  ALT  11  /  AlkPhos  99        Urinalysis Basic - ( 2021 14:09 )    Color: Yellow / Appearance: SL Cloudy / S.020 / pH: x  Gluc: x / Ketone: NEGATIVE  / Bili: Small / Urobili: 2.0 E.U./dL   Blood: x / Protein: 100 mg/dL / Nitrite: POSITIVE   Leuk Esterase: Moderate / RBC: < 5 /HPF / WBC Many /HPF   Sq Epi: x / Non Sq Epi: 0-5 /HPF / Bacteria: Many /HPF            Culture - Urine (collected 21 @ 15:36)  Source: Clean Catch Clean Catch (Midstream)  Preliminary Report (21 @ 12:20):    >100,000 CFU/ml Klebsiella pneumoniae    Susceptibility to follow.        IMAGING/EKG/ETC

## 2021-07-25 NOTE — PROGRESS NOTE ADULT - ASSESSMENT
51M with hx anxiety, depression, gonorrhea, h/o UTI, pyelonephritis and cocaine use, on Truvada presenting to ER for left scrotal swelling and pain worsening over 5days. Reported subjective f/c.  Scrotal US scrotal 7/24 showed worsening of left epididymoorchitis since 4/22/21 with new hypoechoic, avascular, sub-centimeter focus within the left epididymal tail which may represent abscess. Admitted for IV abx and pain control. PVR normal.    Recommendations  - Abx per primary team  - f/u urine cultures  - f/u G/C NAAT  - NSAIDS  - Scrotal Elevation  - ICE packs  - No acute urologic intervention at this time

## 2021-07-25 NOTE — PROGRESS NOTE ADULT - PROBLEM SELECTOR PLAN 8
F: none  E: Replete K<4, Mg<2  N: Regular  DVT PPX: SCDs  Dispo; RMF->Home F: none  E: Replete K<4, Mg<2  N: Regular  DVT PPX: SCDs  Dispo: Home

## 2021-07-25 NOTE — PROGRESS NOTE ADULT - ATTENDING COMMENTS
Pt reports continued pain, US testicles showing possible abscess? Per urology no interventions at this time, pt HDS at this time, c/w abx

## 2021-07-25 NOTE — PROGRESS NOTE ADULT - ASSESSMENT
51M PMH depression/anxiety, gonorrhea, frequent UTIs complicated by pyelonephritis (5 years ago) s/p urethral dilatation for stricture as a child, MSM (on Truvada) presenting with subjective fevers, fatigue, generalized weakness, suprapubic pain and left scrotal pain and swelling for the past 6 days admitted for treatment of worsening left epididymoorchitis.
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
## 2021-07-26 ENCOUNTER — TRANSCRIPTION ENCOUNTER (OUTPATIENT)
Age: 51
End: 2021-07-26

## 2021-07-26 DIAGNOSIS — R10.9 UNSPECIFIED ABDOMINAL PAIN: ICD-10-CM

## 2021-07-26 DIAGNOSIS — K59.00 CONSTIPATION, UNSPECIFIED: ICD-10-CM

## 2021-07-26 LAB
-  AMPICILLIN/SULBACTAM: SIGNIFICANT CHANGE UP
-  AMPICILLIN: SIGNIFICANT CHANGE UP
-  CEFAZOLIN: SIGNIFICANT CHANGE UP
-  CEFTRIAXONE: SIGNIFICANT CHANGE UP
-  CIPROFLOXACIN: SIGNIFICANT CHANGE UP
-  ERTAPENEM: SIGNIFICANT CHANGE UP
-  GENTAMICIN: SIGNIFICANT CHANGE UP
-  NITROFURANTOIN: SIGNIFICANT CHANGE UP
-  PIPERACILLIN/TAZOBACTAM: SIGNIFICANT CHANGE UP
-  TOBRAMYCIN: SIGNIFICANT CHANGE UP
-  TRIMETHOPRIM/SULFAMETHOXAZOLE: SIGNIFICANT CHANGE UP
ANION GAP SERPL CALC-SCNC: 7 MMOL/L — SIGNIFICANT CHANGE UP (ref 5–17)
BASOPHILS # BLD AUTO: 0.04 K/UL — SIGNIFICANT CHANGE UP (ref 0–0.2)
BASOPHILS NFR BLD AUTO: 0.5 % — SIGNIFICANT CHANGE UP (ref 0–2)
BUN SERPL-MCNC: 11 MG/DL — SIGNIFICANT CHANGE UP (ref 7–23)
CALCIUM SERPL-MCNC: 8.9 MG/DL — SIGNIFICANT CHANGE UP (ref 8.4–10.5)
CHLORIDE SERPL-SCNC: 106 MMOL/L — SIGNIFICANT CHANGE UP (ref 96–108)
CO2 SERPL-SCNC: 30 MMOL/L — SIGNIFICANT CHANGE UP (ref 22–31)
CREAT SERPL-MCNC: 0.85 MG/DL — SIGNIFICANT CHANGE UP (ref 0.5–1.3)
CULTURE RESULTS: SIGNIFICANT CHANGE UP
EOSINOPHIL # BLD AUTO: 0.26 K/UL — SIGNIFICANT CHANGE UP (ref 0–0.5)
EOSINOPHIL NFR BLD AUTO: 3.1 % — SIGNIFICANT CHANGE UP (ref 0–6)
GLUCOSE SERPL-MCNC: 129 MG/DL — HIGH (ref 70–99)
HCT VFR BLD CALC: 34.9 % — LOW (ref 39–50)
HGB BLD-MCNC: 11 G/DL — LOW (ref 13–17)
IMM GRANULOCYTES NFR BLD AUTO: 0.5 % — SIGNIFICANT CHANGE UP (ref 0–1.5)
LYMPHOCYTES # BLD AUTO: 2.04 K/UL — SIGNIFICANT CHANGE UP (ref 1–3.3)
LYMPHOCYTES # BLD AUTO: 24.2 % — SIGNIFICANT CHANGE UP (ref 13–44)
MAGNESIUM SERPL-MCNC: 1.9 MG/DL — SIGNIFICANT CHANGE UP (ref 1.6–2.6)
MCHC RBC-ENTMCNC: 28.9 PG — SIGNIFICANT CHANGE UP (ref 27–34)
MCHC RBC-ENTMCNC: 31.5 GM/DL — LOW (ref 32–36)
MCV RBC AUTO: 91.8 FL — SIGNIFICANT CHANGE UP (ref 80–100)
METHOD TYPE: SIGNIFICANT CHANGE UP
MONOCYTES # BLD AUTO: 0.58 K/UL — SIGNIFICANT CHANGE UP (ref 0–0.9)
MONOCYTES NFR BLD AUTO: 6.9 % — SIGNIFICANT CHANGE UP (ref 2–14)
NEUTROPHILS # BLD AUTO: 5.48 K/UL — SIGNIFICANT CHANGE UP (ref 1.8–7.4)
NEUTROPHILS NFR BLD AUTO: 64.8 % — SIGNIFICANT CHANGE UP (ref 43–77)
NRBC # BLD: 0 /100 WBCS — SIGNIFICANT CHANGE UP (ref 0–0)
ORGANISM # SPEC MICROSCOPIC CNT: SIGNIFICANT CHANGE UP
ORGANISM # SPEC MICROSCOPIC CNT: SIGNIFICANT CHANGE UP
PLATELET # BLD AUTO: 307 K/UL — SIGNIFICANT CHANGE UP (ref 150–400)
POTASSIUM SERPL-MCNC: 3.7 MMOL/L — SIGNIFICANT CHANGE UP (ref 3.5–5.3)
POTASSIUM SERPL-SCNC: 3.7 MMOL/L — SIGNIFICANT CHANGE UP (ref 3.5–5.3)
RBC # BLD: 3.8 M/UL — LOW (ref 4.2–5.8)
RBC # FLD: 13.7 % — SIGNIFICANT CHANGE UP (ref 10.3–14.5)
SODIUM SERPL-SCNC: 143 MMOL/L — SIGNIFICANT CHANGE UP (ref 135–145)
SPECIMEN SOURCE: SIGNIFICANT CHANGE UP
WBC # BLD: 8.44 K/UL — SIGNIFICANT CHANGE UP (ref 3.8–10.5)
WBC # FLD AUTO: 8.44 K/UL — SIGNIFICANT CHANGE UP (ref 3.8–10.5)

## 2021-07-26 PROCEDURE — 74177 CT ABD & PELVIS W/CONTRAST: CPT | Mod: 26

## 2021-07-26 PROCEDURE — 99233 SBSQ HOSP IP/OBS HIGH 50: CPT | Mod: GC

## 2021-07-26 RX ORDER — POLYETHYLENE GLYCOL 3350 17 G/17G
17 POWDER, FOR SOLUTION ORAL DAILY
Refills: 0 | Status: DISCONTINUED | OUTPATIENT
Start: 2021-07-26 | End: 2021-07-27

## 2021-07-26 RX ORDER — POTASSIUM CHLORIDE 20 MEQ
40 PACKET (EA) ORAL ONCE
Refills: 0 | Status: COMPLETED | OUTPATIENT
Start: 2021-07-26 | End: 2021-07-26

## 2021-07-26 RX ADMIN — Medication 15 MILLIGRAM(S): at 15:48

## 2021-07-26 RX ADMIN — BUPROPION HYDROCHLORIDE 150 MILLIGRAM(S): 150 TABLET, EXTENDED RELEASE ORAL at 09:22

## 2021-07-26 RX ADMIN — Medication 100 MILLIGRAM(S): at 06:36

## 2021-07-26 RX ADMIN — POLYETHYLENE GLYCOL 3350 17 GRAM(S): 17 POWDER, FOR SOLUTION ORAL at 11:17

## 2021-07-26 RX ADMIN — PANTOPRAZOLE SODIUM 40 MILLIGRAM(S): 20 TABLET, DELAYED RELEASE ORAL at 06:36

## 2021-07-26 RX ADMIN — CEFTRIAXONE 100 MILLIGRAM(S): 500 INJECTION, POWDER, FOR SOLUTION INTRAMUSCULAR; INTRAVENOUS at 23:02

## 2021-07-26 RX ADMIN — Medication 150 MILLIGRAM(S): at 01:14

## 2021-07-26 RX ADMIN — Medication 15 MILLIGRAM(S): at 06:36

## 2021-07-26 RX ADMIN — ESCITALOPRAM OXALATE 20 MILLIGRAM(S): 10 TABLET, FILM COATED ORAL at 22:59

## 2021-07-26 RX ADMIN — Medication 40 MILLIEQUIVALENT(S): at 09:22

## 2021-07-26 RX ADMIN — PANTOPRAZOLE SODIUM 40 MILLIGRAM(S): 20 TABLET, DELAYED RELEASE ORAL at 17:28

## 2021-07-26 RX ADMIN — Medication 15 MILLIGRAM(S): at 16:44

## 2021-07-26 NOTE — PROGRESS NOTE ADULT - SUBJECTIVE AND OBJECTIVE BOX
O/N Events: patient states pain has not been resolving    Subjective/ROS: Patient seen and examined at bedside.     Denies Fever/Chills, HA, CP, SOB, n/v, changes in bowel/urinary habits.  12pt ROS otherwise negative.    VITALS  Vital Signs Last 24 Hrs  T(C): 36.7 (2021 05:00), Max: 36.9 (2021 20:23)  T(F): 98.1 (2021 05:00), Max: 98.4 (2021 20:23)  HR: 60 (2021 05:00) (60 - 73)  BP: 103/64 (2021 05:00) (103/64 - 123/74)  BP(mean): --  RR: 18 (2021 05:00) (17 - 18)  SpO2: 95% (2021 05:00) (95% - 97%)    CAPILLARY BLOOD GLUCOSE          PHYSICAL EXAM  General: NAD  Head: NC/AT; MMM; PERRL; EOMI;  Neck: Supple; no JVD  Respiratory: CTAB; no wheezes/rales/rhonchi  Cardiovascular: Regular rhythm/rate; S1/S2+, no murmurs, rubs gallops   Gastrointestinal: Soft; tenderness in LLQ, ND; bowel sounds normal and present  Extremities: WWP; no edema/cyanosis  Neurological: A&Ox3, CNII-XII grossly intact; no obvious focal deficits    MEDICATIONS  (STANDING):  buPROPion XL (24-Hour) . 150 milliGRAM(s) Oral every 24 hours  cefTRIAXone   IVPB 2000 milliGRAM(s) IV Intermittent every 24 hours  escitalopram 20 milliGRAM(s) Oral every 24 hours  levoFLOXacin  Tablet 500 milliGRAM(s) Oral every 24 hours  pantoprazole    Tablet 40 milliGRAM(s) Oral every 12 hours  potassium chloride   Powder 40 milliEquivalent(s) Oral once  traZODone 150 milliGRAM(s) Oral every 24 hours    MEDICATIONS  (PRN):  acetaminophen   Tablet .. 650 milliGRAM(s) Oral every 6 hours PRN Temp greater or equal to 38C (100.4F), Mild Pain (1 - 3)  clonazePAM  Tablet 1 milliGRAM(s) Oral every 12 hours PRN anxiety  ketorolac   Injectable 15 milliGRAM(s) IV Push every 6 hours PRN Moderate Pain (4 - 6)      No Known Allergies      LABS                        11.0   8.44  )-----------( 307      ( 2021 05:55 )             34.9     -    143  |  106  |  11  ----------------------------<  129<H>  3.7   |  30  |  0.85    Ca    8.9      2021 05:55  Phos  4.6     -  Mg     1.9         TPro  6.1  /  Alb  3.2<L>  /  TBili  0.2  /  DBili  x   /  AST  14  /  ALT  11  /  AlkPhos  99        Urinalysis Basic - ( 2021 14:09 )    Color: Yellow / Appearance: SL Cloudy / S.020 / pH: x  Gluc: x / Ketone: NEGATIVE  / Bili: Small / Urobili: 2.0 E.U./dL   Blood: x / Protein: 100 mg/dL / Nitrite: POSITIVE   Leuk Esterase: Moderate / RBC: < 5 /HPF / WBC Many /HPF   Sq Epi: x / Non Sq Epi: 0-5 /HPF / Bacteria: Many /HPF              IMAGING/EKG/ETC   O/N Events: patient states pain has not been resolving    Subjective/ROS: Patient seen and examined at bedside.     + Fever/Chills, +Abdominal pain. Denies HA, CP, SOB, n/v, changes in bowel/urinary habits.  12pt ROS otherwise negative.    VITALS  Vital Signs Last 24 Hrs  T(C): 36.7 (2021 05:00), Max: 36.9 (2021 20:23)  T(F): 98.1 (2021 05:00), Max: 98.4 (2021 20:23)  HR: 60 (2021 05:00) (60 - 73)  BP: 103/64 (2021 05:00) (103/64 - 123/74)  BP(mean): --  RR: 18 (2021 05:00) (17 - 18)  SpO2: 95% (2021 05:00) (95% - 97%)    CAPILLARY BLOOD GLUCOSE          PHYSICAL EXAM  General: NAD  Head: NC/AT; MMM; PERRL; EOMI;  Neck: Supple; no JVD  Respiratory: CTAB; no wheezes/rales/rhonchi  Cardiovascular: Regular rhythm/rate; S1/S2+, no murmurs, rubs gallops   Gastrointestinal: Soft; tenderness in LLQ, ND; bowel sounds normal and present  Extremities: WWP; no edema/cyanosis  Neurological: A&Ox3, CNII-XII grossly intact; no obvious focal deficits    MEDICATIONS  (STANDING):  buPROPion XL (24-Hour) . 150 milliGRAM(s) Oral every 24 hours  cefTRIAXone   IVPB 2000 milliGRAM(s) IV Intermittent every 24 hours  escitalopram 20 milliGRAM(s) Oral every 24 hours  levoFLOXacin  Tablet 500 milliGRAM(s) Oral every 24 hours  pantoprazole    Tablet 40 milliGRAM(s) Oral every 12 hours  potassium chloride   Powder 40 milliEquivalent(s) Oral once  traZODone 150 milliGRAM(s) Oral every 24 hours    MEDICATIONS  (PRN):  acetaminophen   Tablet .. 650 milliGRAM(s) Oral every 6 hours PRN Temp greater or equal to 38C (100.4F), Mild Pain (1 - 3)  clonazePAM  Tablet 1 milliGRAM(s) Oral every 12 hours PRN anxiety  ketorolac   Injectable 15 milliGRAM(s) IV Push every 6 hours PRN Moderate Pain (4 - 6)      No Known Allergies      LABS                        11.0   8.44  )-----------( 307      ( 2021 05:55 )             34.9         143  |  106  |  11  ----------------------------<  129<H>  3.7   |  30  |  0.85    Ca    8.9      2021 05:55  Phos  4.6       Mg     1.9         TPro  6.1  /  Alb  3.2<L>  /  TBili  0.2  /  DBili  x   /  AST  14  /  ALT  11  /  AlkPhos  99        Urinalysis Basic - ( 2021 14:09 )    Color: Yellow / Appearance: SL Cloudy / S.020 / pH: x  Gluc: x / Ketone: NEGATIVE  / Bili: Small / Urobili: 2.0 E.U./dL   Blood: x / Protein: 100 mg/dL / Nitrite: POSITIVE   Leuk Esterase: Moderate / RBC: < 5 /HPF / WBC Many /HPF   Sq Epi: x / Non Sq Epi: 0-5 /HPF / Bacteria: Many /HPF              IMAGING/EKG/ETC

## 2021-07-26 NOTE — DISCHARGE NOTE PROVIDER - NSDCFUSCHEDAPPT_GEN_ALL_CORE_FT
CINDI PARIKH ; 08/02/2021 ; [facility] [address] CINDI PARIKH ; 08/02/2021 ; [facility] [address]  CINDI PARIKH ; 08/03/2021 ; [facility] [address]

## 2021-07-26 NOTE — DISCHARGE NOTE PROVIDER - NSDCCPCAREPLAN_GEN_ALL_CORE_FT
PRINCIPAL DISCHARGE DIAGNOSIS  Diagnosis: Epididymo-orchitis  Assessment and Plan of Treatment: Inflammation of the tube at the back of the testicle that stores and carries sperm.  Epididymitis is often caused by a bacterial or sexually transmitted infection.Pain and swelling in the testicle are common.  Treatment usually is antibiotics. Rarely, pus may need to be drained or part or all of the coiled tube may be surgically removed.  We have treated you with Ceftriaxone and Levofloxacin.

## 2021-07-26 NOTE — PROGRESS NOTE ADULT - PROBLEM SELECTOR PLAN 3
- counseled on unsafe sexual practices  - continue abx to treat left epididymoorchitis and UTI as above  - urology following  - f/u urine cultures -Miralax

## 2021-07-26 NOTE — PROGRESS NOTE ADULT - ASSESSMENT
51M with hx anxiety, depression, gonorrhea, h/o UTI, pyelonephritis and cocaine use, on Truvada presenting to ER for left scrotal swelling and pain worsening over 5days. Reported subjective f/c.  Scrotal US scrotal 7/24 showed worsening of left epididymoorchitis since 4/22/21 with new hypoechoic, avascular, sub-centimeter focus within the left epididymal tail which may represent abscess. Admitted for IV abx and pain control. PVR normal. Leukocytosis resolved. UCx growing Klebsiella, sensitivities pending.    Recommendations  - Abx per primary team  - f/u urine sensitivities  - f/u G/C NAAT  - NSAIDS  - Scrotal Elevation  - ICE packs  - No acute urologic intervention at this time   51M with hx anxiety, depression, gonorrhea, h/o UTI, pyelonephritis and cocaine use, on Truvada presenting to ER for left scrotal swelling and pain worsening over 5days. Reported subjective f/c.  Scrotal US scrotal 7/24 showed worsening of left epididymoorchitis since 4/22/21 with new hypoechoic, avascular, sub-centimeter focus within the left epididymal tail which may represent abscess. Admitted for IV abx and pain control. PVR normal. Leukocytosis resolved. UCx growing Klebsiella, sensitivities pending.    Recommendations  - Abx per primary team  - f/u urine sensitivities  - f/u G/C NAAT  - NSAIDS  - Scrotal Elevation  - ICE packs  - No acute urologic intervention at this time  - Can follow up with the Urology Clinic within 1-2 weeks of discharge.  Call [phone] to schedule an appointment.  The office is located at [facility] ([facility] [address] [place] [address] on the 4th floor.

## 2021-07-26 NOTE — PROGRESS NOTE ADULT - PROBLEM SELECTOR PLAN 4
on admission, hb 11.7 (hb 14.4, 3 months ago)  - no signs and symptoms of bleeding on exam  - f/u B12 and folate  - f/u iron panel  - qualifies for outpatient colonoscopy screening  - maintain active type and screens - counseled on unsafe sexual practices  - continue abx to treat left epididymoorchitis and UTI as above  - urology following  - f/u urine cultures

## 2021-07-26 NOTE — PROGRESS NOTE ADULT - PROBLEM SELECTOR PLAN 8
- continue home Klonopin 1mg PO BID PRN for anxiety - continue home Lexapro 20mg daily and Wellbutrin 150mg daily  - continue home Trazadone 150mg at bedtime for sleep # Moderate depression  - continue home Lexapro 20mg daily and Wellbutrin 150mg daily  - continue home Trazadone 150mg at bedtime for sleep

## 2021-07-26 NOTE — DISCHARGE NOTE PROVIDER - NSDCFUADDAPPT_GEN_ALL_CORE_FT
Please bring your Insurance card, Photo ID and Discharge paperwork to the following appointment:    (1) Please follow up with your Primary Care Provider, Dr. Hardy Byrd at [address] [place] [address], [place] [address] on 08/02/2021 at 3:20pm.    Appointment was scheduled by Ms. MARISELA Davis, Referral Coordinator.   Please bring your Insurance card, Photo ID and Discharge paperwork to the following appointment:    (1) Please follow up with your Primary Care Provider, Dr. Hardy Byrd at [address] [place] [address], [facility], [place] [address] on 08/02/2021 at 3:20pm.  Tell your primary care physician to help you schedule an appointment with a urologist. Either Erinn Dos Santos MD or a [facility] physician.    Appointment was scheduled by Ms. MARISELA Davis, Referral Coordinator.   Please bring your Insurance card, Photo ID and Discharge paperwork to the following appointment:    (1) Please follow up with your Primary Care Provider, Dr. Hardy Byrd at [address] [place] [address], [place] [address] on 08/02/2021 at 3:20pm.      Appointment was scheduled by Ms. MARISELA Davis, Referral Coordinator.

## 2021-07-26 NOTE — PROGRESS NOTE ADULT - PROBLEM SELECTOR PLAN 7
- continue home Lexapro 20mg daily and Wellbutrin 150mg daily  - continue home Trazadone 150mg at bedtime for sleep - continue with home omeprazole 20mg BID

## 2021-07-26 NOTE — DISCHARGE NOTE PROVIDER - NSDCMRMEDTOKEN_GEN_ALL_CORE_FT
KlonoPIN 1 mg oral tablet: 1 tab(s) orally every 12 hours, As Needed  levoFLOXacin 500 mg oral tablet: 1 tab(s) orally every 24 hours  Lexapro 20 mg oral tablet: 1 tab(s) orally once a day  omeprazole 20 mg oral delayed release capsule: 1 cap(s) orally every 12 hours  Propecia 1 mg oral tablet: 1 tab(s) orally once a day  traZODone 150 mg oral tablet: 1 tab(s) orally every 24 hours  Truvada 100 mg-150 mg oral tablet: 1 tab(s) orally once a day  Wellbutrin: 150 milligram(s) orally every 24 hours   KlonoPIN 1 mg oral tablet: 1 tab(s) orally every 12 hours, As Needed  levoFLOXacin 500 mg oral tablet: 1 tab(s) orally every 24 hours    M2B  7UR 7601 01 7/27 @ 12:00PM  [id number]  Lexapro 20 mg oral tablet: 1 tab(s) orally once a day  omeprazole 20 mg oral delayed release capsule: 1 cap(s) orally every 12 hours  Propecia 1 mg oral tablet: 1 tab(s) orally once a day  traZODone 150 mg oral tablet: 1 tab(s) orally every 24 hours  Truvada 100 mg-150 mg oral tablet: 1 tab(s) orally once a day  Wellbutrin: 150 milligram(s) orally every 24 hours

## 2021-07-26 NOTE — DISCHARGE NOTE PROVIDER - CARE PROVIDER_API CALL
Hardy Byrd)  Internal Medicine  [address], [address] [place] [address]  Phone: [phone]  Fax: [phone]  Established Patient  Follow Up Time: 1 week   Hardy Byrd)  Internal Medicine  [address], [address]  [place] [address]  Phone: [phone]  Fax: [phone]  Established Patient  Follow Up Time: 1 week    Erinn Dos Santos)  Urology  [address] [place] [address]  Phone: [phone]  Fax: [phone]  Follow Up Time: 1 week   Hardy Byrd)  Internal Medicine  [address], [address]  [place] [address]  Phone: [phone]  Fax: [phone]  Established Patient  Scheduled Appointment: 08/02/2021 03:20 PM    Erinn Dos Santos)  Urology  [address] [place] [address]  Phone: [phone]  Fax: [phone]  Follow Up Time: 1 week   Hardy Byrd)  Internal Medicine  [address], [address], [place] [address]  Phone: [phone]  Fax: [phone]  Established Patient  Scheduled Appointment: 08/02/2021 03:20 PM   Hardy Byrd)  Internal Medicine  [address], [address] [place] [address]  Phone: [phone]  Fax: [phone]  Established Patient  Scheduled Appointment: 08/02/2021 03:20 PM    Erinn Dos Santos  [address] [place] [address]  Phone: [phone]  Fax: [phone]  Scheduled Appointment: 08/03/2021 10:00 AM

## 2021-07-26 NOTE — PROGRESS NOTE ADULT - SUBJECTIVE AND OBJECTIVE BOX
SUBJECTIVE: NAEON. AVSS. Reports unchanged testicular pain. 8/10 w/ toradol.    cefTRIAXone   IVPB 2000 milliGRAM(s) IV Intermittent every 24 hours  doxycycline hyclate Capsule 100 milliGRAM(s) Oral every 12 hours      Vital Signs Last 24 Hrs  T(C): 36.7 (2021 05:00), Max: 36.9 (2021 20:23)  T(F): 98.1 (2021 05:00), Max: 98.4 (2021 20:23)  HR: 60 (2021 05:00) (60 - 73)  BP: 103/64 (2021 05:00) (103/64 - 123/74)  BP(mean): --  RR: 18 (2021 05:00) (17 - 18)  SpO2: 95% (2021 05:00) (95% - 97%)  I&O's Detail      Physical Exam:  General: No acute distress, resting comfortably in bed  Pulm: Nonlabored breathing, no respiratory distress  : L testicle swollen, no erythema, no crepitus, diffusely TTP. R testicular exam normal. No penile discharge or tenderness on palpation.    LABS:                        11.0   8.44  )-----------( 307      ( 2021 05:55 )             34.9     07-26    143  |  106  |  11  ----------------------------<  129<H>  3.7   |  30  |  0.85    Ca    8.9      2021 05:55  Phos  4.6     07-25  Mg     1.9     -    TPro  6.1  /  Alb  3.2<L>  /  TBili  0.2  /  DBili  x   /  AST  14  /  ALT  11  /  AlkPhos  99  07-25      Urinalysis Basic - ( 2021 14:09 )    Color: Yellow / Appearance: SL Cloudy / S.020 / pH: x  Gluc: x / Ketone: NEGATIVE  / Bili: Small / Urobili: 2.0 E.U./dL   Blood: x / Protein: 100 mg/dL / Nitrite: POSITIVE   Leuk Esterase: Moderate / RBC: < 5 /HPF / WBC Many /HPF   Sq Epi: x / Non Sq Epi: 0-5 /HPF / Bacteria: Many /HPF        RADIOLOGY & ADDITIONAL STUDIES:

## 2021-07-26 NOTE — DISCHARGE NOTE PROVIDER - CARE PROVIDERS DIRECT ADDRESSES
,[email or web address],DirectAddress_Unknown ,[email or web address],DirectAddress_Unknown

## 2021-07-26 NOTE — PROGRESS NOTE ADULT - PROBLEM SELECTOR PLAN 5
ADDENDUM: on truvada, encourage safe sex practices on admission, hb 11.7 (hb 14.4, 3 months ago)  - no signs and symptoms of bleeding on exam  - f/u B12 and folate  - f/u iron panel  - qualifies for outpatient colonoscopy screening  - maintain active type and screens

## 2021-07-26 NOTE — DISCHARGE NOTE PROVIDER - HOSPITAL COURSE
51M PMH depression/anxiety, gonorrhea, frequent UTIs complicated by pyelonephritis (5 years ago) s/p urethral dilatation for stricture as a child, MSM (on Truvada) presenting with subjective fevers, fatigue, generalized weakness, suprapubic pain and left scrotal pain and swelling for the past 6 days admitted for treatment of worsening left epididymoorchitis.    #Epididymo-orchitis.  Plan: - urology following, abscess too small for drainage  - HIV screen -> negative  - d/c doxycyline 100mg PO BID for 10 days  - continue ceftriaxone 2g daily to cover for presumptive pyelo  - f/u gonorrhea/chlamydia  - pain control with Tylenol (mild) and Toradol (moderate)  - f/u blood cultures and urine cultures  - Start Levofloxacin 500 mg PO QD for 10 days per [facility] protocol.    #Abdominal pain.  Plan: CT abdomen and pelvis.     #Constipation.  Plan: -Miralax.     #Frequent UTI.  Plan: - counseled on unsafe sexual practices  - continue abx to treat left epididymoorchitis and UTI as above  - urology following  - f/u urine cultures.     #Anemia.  Plan: on admission, hb 11.7 (hb 14.4, 3 months ago)  - no signs and symptoms of bleeding on exam  - f/u B12 and folate  - f/u iron panel  - qualifies for outpatient colonoscopy screening  - maintain active type and screens.     #High risk homosexual behavior. Plan: ADDENDUM: on truvada, encourage safe sex practices.    #GERD (gastroesophageal reflux disease).  Plan: - continue with home omeprazole 20mg BID.     #Depression.  Plan: - continue home Lexapro 20mg daily and Wellbutrin 150mg daily  - continue home Trazadone 150mg at bedtime for sleep.     #Anxiety.  Plan: - continue home Klonopin 1mg PO BID PRN for anxiety.     Medications changed: Added Levofloxacin   Exams to follow: Pelvic Exam  Labs to follow: CBC with diff 51M PMH depression/anxiety, gonorrhea, frequent UTIs complicated by pyelonephritis (5 years ago) s/p urethral dilatation for stricture as a child, MSM (on Truvada) presenting with subjective fevers, fatigue, generalized weakness, suprapubic pain and left scrotal pain and swelling for the past 6 days admitted for treatment of worsening left epididymoorchitis.    #Epididymo-orchitis.  Plan: - urology following, abscess too small for drainage  - HIV screen -> negative  - Received ceftriaxone 2g daily to cover for presumptive pyelo and possible gonorrhea.   - pain control with Tylenol (mild) and Toradol (moderate)  - Switched from doxycycline to Levofloxacin 500 mg PO QD for 10 days for epididymo-orchitis (risk factor: anal insertive sex).    # Complicated Urinary tract infection   Separate from epididymo-orchitis   Grew Klebsiella pneumoniae, sensitive to Ceftriaxone, Ciprofloxacin  Levofloxacin for epididymo-orchitis will also provide coverage for UTI and possible chlamydia  Adequate coverage for gonorrhea provided by IV  Ceftriaxone received earlier in hospital course.     #Abdominal pain.  Plan: CT abdomen and pelvis unrevealing. Abd pain improved with conservative management     #Constipation.  Plan: -Miralax.     #Frequent UTI.  Plan: - counseled on unsafe sexual practices  - continue abx to treat left epididymoorchitis and UTI as above  - f/u with urology outpatient     #Anemia.  Plan: on admission, hb 11.7 (hb 14.4, 3 months ago)  - no signs and symptoms of bleeding on exam  - f/u B12 and folate  - f/u iron panel  - qualifies for outpatient colonoscopy screening  - maintain active type and screens.     #High risk homosexual behavior. Plan: ADDENDUM: on truvada, encourage safe sex practices.    #GERD (gastroesophageal reflux disease).  Plan: - continue with home omeprazole 20mg BID.     #Moderate Depression.  Plan: - continue home Lexapro 20mg daily and Wellbutrin 150mg daily  - continue home Trazadone 150mg at bedtime for sleep.     #Anxiety.  Plan: - continue home Klonopin 1mg PO BID PRN for anxiety.     Medications changed: Added Levofloxacin   Exams to follow: Pelvic Exam  Labs to follow: CBC with diff    ******************************************************  ATTENDING ATTESTATION    I have read and agree with the resident Discharge Note above. Patient seen and discussed with resident team on the day of discharge.     Briefly,    51 year old man, MSM (on Truvada), hx of frequent UTIs (had pyelonephritis 5 years ago), who presented with left scrotal pain and swelling, fever, generalized weakness.   Treated for complicated urinary tract infection and epididymo-orchitis (evaluated by urology - judged to not require surgical intervention)  Received IV Ceftriaxone inpatient for presumptive treatment of pyelonephritis - CTX also covered possible gonorrhea, given risk factors.   Transitioned to Levofloxacin x 10 days for epididymoorchitis (abx choice: levofloxacin because of anal insertive sex) [ Duration of treatment would also cover pyelonephritis ]     Physical Exam:    Gen: Reclining in bed at time of exam; In no acute distress.   HEENT: NCAT, MMM, clear OP  Neck: supple, trachea at midline  CV: RRR, +S1/S2  Pulm: adequate respiratory effort, no increased work of breathing  Abd: soft, NTND, not obese     : Left scrotal swelling with tenderness to palpation. not erythematous on day of discharge. Not worsened from initial presentation.     Skin: warm and dry, no new rashes vs prior report  Ext: WWP, no LE edema   Neuro: AOx3, no gross focal neurological deficits; Able to move all 4 extremities   Psych: affect and behavior appropriate; pleasant     I was physically present for the evaluation and management services provided. I agree with the included history, physical, and plan which I reviewed and edited where appropriate. I spent > 30 minutes with the patient and the patient's family on direct patient care and discharge planning with more than 50% of the visit spent on counseling and/or coordination of care.

## 2021-07-26 NOTE — PROGRESS NOTE ADULT - ATTENDING COMMENTS
# Epididymo-orchitis   Switching from doxycycline to Levofloxacin due to history of anal insertive intercourse   Duration of abx 10 days     # Urinary tract infection   Covered by Levofloxacin x total 10 days of abx    # Moderate depression   Continue home meds as above # Epididymo-orchitis   Switching from doxycycline to Levofloxacin due to history of anal insertive intercourse   Duration of abx 10 days     # Urinary tract infection   Covered by Levofloxacin x total 10 days of abx    # Moderate depression   Continue home meds as above    # Constipation   Large stool burden on CT abd   - Miralax BID

## 2021-07-26 NOTE — PROGRESS NOTE ADULT - PROBLEM SELECTOR PLAN 1
- urology following, abscess too small for drainage  - HIV screen -> negative  - d/c doxycyline 100mg PO BID for 10 days  - continue ceftriaxone 2g daily to cover for presumptive pyelo  - f/u gonorrhea/chlamydia  - pain control with Tylenol (mild) and Toradol (moderate)  - f/u blood cultures and urine cultures  - Start Levofloxacin per [facility] protocol - urology following, abscess too small for drainage  - HIV screen -> negative  - d/c doxycyline 100mg PO BID for 10 days  - continue ceftriaxone 2g daily to cover for presumptive pyelo  - f/u gonorrhea/chlamydia  - pain control with Tylenol (mild) and Toradol (moderate)  - f/u blood cultures and urine cultures  - Start Levofloxacin 500 mg PO QD for 10 days per [place] [facility] protocol - urology following, abscess too small for drainage  - HIV screen -> negative  - d/c doxycyline 100mg  - continue ceftriaxone 2g daily to cover for presumptive pyelo  - f/u gonorrhea/chlamydia  - pain control with Tylenol (mild) and Toradol (moderate)  - f/u blood cultures and urine cultures  - Start Levofloxacin 500 mg PO QD for 10 days    # Complicated Urinary tract infection   Separate from epididymo-orchitis   Grew Klebsiella pneumoniae, sensitive to Ceftriaxone, Ciprofloxacin  Levofloxacin for epididymo-orchitis will also provide coverage for UTI  d/c Ceftriaxone tomorrow.

## 2021-07-26 NOTE — PROGRESS NOTE ADULT - PROBLEM SELECTOR PLAN 9
F: none  E: Replete K<4, Mg<2  N: Regular  DVT PPX: SCDs  Dispo: Home - continue home Klonopin 1mg PO BID PRN for anxiety

## 2021-07-26 NOTE — DISCHARGE NOTE PROVIDER - PROVIDER TOKENS
PROVIDER:[TOKEN:[[id number]],FOLLOWUP:[1 week],ESTABLISHEDPATIENT:[T]] PROVIDER:[TOKEN:[[id number]],FOLLOWUP:[1 week],ESTABLISHEDPATIENT:[T]],PROVIDER:[TOKEN:[[id number]],FOLLOWUP:[1 week]] PROVIDER:[TOKEN:[[id number]],SCHEDULEDAPPT:[08/02/2021],SCHEDULEDAPPTTIME:[03:20 PM],ESTABLISHEDPATIENT:[T]],PROVIDER:[TOKEN:[[id number]],FOLLOWUP:[1 week]] PROVIDER:[TOKEN:[[id number]],SCHEDULEDAPPT:[08/02/2021],SCHEDULEDAPPTTIME:[03:20 PM],ESTABLISHEDPATIENT:[T]] PROVIDER:[TOKEN:[[id number]],SCHEDULEDAPPT:[08/02/2021],SCHEDULEDAPPTTIME:[03:20 PM],ESTABLISHEDPATIENT:[T]],FREE:[LAST:[Levon],FIRST:[Erinn],PHONE:[phone]],FAX:[phone]],ADDRESS:[[address] [place] [address]],SCHEDULEDAPPT:[08/03/2021],SCHEDULEDAPPTTIME:[10:00 AM]]

## 2021-07-27 ENCOUNTER — TRANSCRIPTION ENCOUNTER (OUTPATIENT)
Age: 51
End: 2021-07-27

## 2021-07-27 VITALS
OXYGEN SATURATION: 95 % | TEMPERATURE: 98 F | HEART RATE: 55 BPM | RESPIRATION RATE: 18 BRPM | SYSTOLIC BLOOD PRESSURE: 101 MMHG | DIASTOLIC BLOOD PRESSURE: 68 MMHG

## 2021-07-27 PROBLEM — K21.9 GASTRO-ESOPHAGEAL REFLUX DISEASE WITHOUT ESOPHAGITIS: Chronic | Status: ACTIVE | Noted: 2021-07-24

## 2021-07-27 PROBLEM — N39.0 URINARY TRACT INFECTION, SITE NOT SPECIFIED: Chronic | Status: ACTIVE | Noted: 2021-07-24

## 2021-07-27 PROBLEM — Z87.448 PERSONAL HISTORY OF OTHER DISEASES OF URINARY SYSTEM: Chronic | Status: ACTIVE | Noted: 2021-07-24

## 2021-07-27 PROBLEM — Z86.19 PERSONAL HISTORY OF OTHER INFECTIOUS AND PARASITIC DISEASES: Chronic | Status: ACTIVE | Noted: 2021-07-24

## 2021-07-27 LAB
ALBUMIN SERPL ELPH-MCNC: 3.1 G/DL — LOW (ref 3.3–5)
ALP SERPL-CCNC: 98 U/L — SIGNIFICANT CHANGE UP (ref 40–120)
ALT FLD-CCNC: 13 U/L — SIGNIFICANT CHANGE UP (ref 10–45)
ANION GAP SERPL CALC-SCNC: 8 MMOL/L — SIGNIFICANT CHANGE UP (ref 5–17)
AST SERPL-CCNC: 15 U/L — SIGNIFICANT CHANGE UP (ref 10–40)
BASOPHILS # BLD AUTO: 0.05 K/UL — SIGNIFICANT CHANGE UP (ref 0–0.2)
BASOPHILS NFR BLD AUTO: 0.6 % — SIGNIFICANT CHANGE UP (ref 0–2)
BILIRUB SERPL-MCNC: <0.2 MG/DL — SIGNIFICANT CHANGE UP (ref 0.2–1.2)
BUN SERPL-MCNC: 10 MG/DL — SIGNIFICANT CHANGE UP (ref 7–23)
CALCIUM SERPL-MCNC: 9.3 MG/DL — SIGNIFICANT CHANGE UP (ref 8.4–10.5)
CHLORIDE SERPL-SCNC: 104 MMOL/L — SIGNIFICANT CHANGE UP (ref 96–108)
CO2 SERPL-SCNC: 30 MMOL/L — SIGNIFICANT CHANGE UP (ref 22–31)
CREAT SERPL-MCNC: 0.95 MG/DL — SIGNIFICANT CHANGE UP (ref 0.5–1.3)
EOSINOPHIL # BLD AUTO: 0.24 K/UL — SIGNIFICANT CHANGE UP (ref 0–0.5)
EOSINOPHIL NFR BLD AUTO: 2.9 % — SIGNIFICANT CHANGE UP (ref 0–6)
GLUCOSE SERPL-MCNC: 101 MG/DL — HIGH (ref 70–99)
HCT VFR BLD CALC: 38.4 % — LOW (ref 39–50)
HGB BLD-MCNC: 12.2 G/DL — LOW (ref 13–17)
IMM GRANULOCYTES NFR BLD AUTO: 0.7 % — SIGNIFICANT CHANGE UP (ref 0–1.5)
LYMPHOCYTES # BLD AUTO: 2.09 K/UL — SIGNIFICANT CHANGE UP (ref 1–3.3)
LYMPHOCYTES # BLD AUTO: 25.7 % — SIGNIFICANT CHANGE UP (ref 13–44)
MAGNESIUM SERPL-MCNC: 1.9 MG/DL — SIGNIFICANT CHANGE UP (ref 1.6–2.6)
MCHC RBC-ENTMCNC: 29.4 PG — SIGNIFICANT CHANGE UP (ref 27–34)
MCHC RBC-ENTMCNC: 31.8 GM/DL — LOW (ref 32–36)
MCV RBC AUTO: 92.5 FL — SIGNIFICANT CHANGE UP (ref 80–100)
MONOCYTES # BLD AUTO: 0.54 K/UL — SIGNIFICANT CHANGE UP (ref 0–0.9)
MONOCYTES NFR BLD AUTO: 6.6 % — SIGNIFICANT CHANGE UP (ref 2–14)
NEUTROPHILS # BLD AUTO: 5.16 K/UL — SIGNIFICANT CHANGE UP (ref 1.8–7.4)
NEUTROPHILS NFR BLD AUTO: 63.5 % — SIGNIFICANT CHANGE UP (ref 43–77)
NRBC # BLD: 0 /100 WBCS — SIGNIFICANT CHANGE UP (ref 0–0)
PHOSPHATE SERPL-MCNC: 4.4 MG/DL — SIGNIFICANT CHANGE UP (ref 2.5–4.5)
PLATELET # BLD AUTO: 363 K/UL — SIGNIFICANT CHANGE UP (ref 150–400)
POTASSIUM SERPL-MCNC: 3.9 MMOL/L — SIGNIFICANT CHANGE UP (ref 3.5–5.3)
POTASSIUM SERPL-SCNC: 3.9 MMOL/L — SIGNIFICANT CHANGE UP (ref 3.5–5.3)
PROT SERPL-MCNC: 6.2 G/DL — SIGNIFICANT CHANGE UP (ref 6–8.3)
RBC # BLD: 4.15 M/UL — LOW (ref 4.2–5.8)
RBC # FLD: 13.2 % — SIGNIFICANT CHANGE UP (ref 10.3–14.5)
SODIUM SERPL-SCNC: 142 MMOL/L — SIGNIFICANT CHANGE UP (ref 135–145)
WBC # BLD: 8.14 K/UL — SIGNIFICANT CHANGE UP (ref 3.8–10.5)
WBC # FLD AUTO: 8.14 K/UL — SIGNIFICANT CHANGE UP (ref 3.8–10.5)

## 2021-07-27 PROCEDURE — 82607 VITAMIN B-12: CPT

## 2021-07-27 PROCEDURE — 82746 ASSAY OF FOLIC ACID SERUM: CPT

## 2021-07-27 PROCEDURE — 87389 HIV-1 AG W/HIV-1&-2 AB AG IA: CPT

## 2021-07-27 PROCEDURE — 83550 IRON BINDING TEST: CPT

## 2021-07-27 PROCEDURE — 86769 SARS-COV-2 COVID-19 ANTIBODY: CPT

## 2021-07-27 PROCEDURE — 96375 TX/PRO/DX INJ NEW DRUG ADDON: CPT

## 2021-07-27 PROCEDURE — 87086 URINE CULTURE/COLONY COUNT: CPT

## 2021-07-27 PROCEDURE — 99239 HOSP IP/OBS DSCHRG MGMT >30: CPT | Mod: GC

## 2021-07-27 PROCEDURE — 83540 ASSAY OF IRON: CPT

## 2021-07-27 PROCEDURE — 74177 CT ABD & PELVIS W/CONTRAST: CPT

## 2021-07-27 PROCEDURE — 80048 BASIC METABOLIC PNL TOTAL CA: CPT

## 2021-07-27 PROCEDURE — 76870 US EXAM SCROTUM: CPT

## 2021-07-27 PROCEDURE — 36415 COLL VENOUS BLD VENIPUNCTURE: CPT

## 2021-07-27 PROCEDURE — 82728 ASSAY OF FERRITIN: CPT

## 2021-07-27 PROCEDURE — 96374 THER/PROPH/DIAG INJ IV PUSH: CPT

## 2021-07-27 PROCEDURE — U0005: CPT

## 2021-07-27 PROCEDURE — 80053 COMPREHEN METABOLIC PANEL: CPT

## 2021-07-27 PROCEDURE — 81001 URINALYSIS AUTO W/SCOPE: CPT

## 2021-07-27 PROCEDURE — 83735 ASSAY OF MAGNESIUM: CPT

## 2021-07-27 PROCEDURE — 85025 COMPLETE CBC W/AUTO DIFF WBC: CPT

## 2021-07-27 PROCEDURE — 99285 EMERGENCY DEPT VISIT HI MDM: CPT

## 2021-07-27 PROCEDURE — 87186 SC STD MICRODIL/AGAR DIL: CPT

## 2021-07-27 PROCEDURE — U0003: CPT

## 2021-07-27 PROCEDURE — 84100 ASSAY OF PHOSPHORUS: CPT

## 2021-07-27 RX ADMIN — Medication 15 MILLIGRAM(S): at 00:14

## 2021-07-27 RX ADMIN — Medication 150 MILLIGRAM(S): at 00:20

## 2021-07-27 RX ADMIN — BUPROPION HYDROCHLORIDE 150 MILLIGRAM(S): 150 TABLET, EXTENDED RELEASE ORAL at 09:08

## 2021-07-27 RX ADMIN — PANTOPRAZOLE SODIUM 40 MILLIGRAM(S): 20 TABLET, DELAYED RELEASE ORAL at 06:25

## 2021-07-27 RX ADMIN — POLYETHYLENE GLYCOL 3350 17 GRAM(S): 17 POWDER, FOR SOLUTION ORAL at 10:07

## 2021-07-27 RX ADMIN — Medication 15 MILLIGRAM(S): at 06:40

## 2021-07-27 RX ADMIN — Medication 15 MILLIGRAM(S): at 06:25

## 2021-07-27 RX ADMIN — Medication 15 MILLIGRAM(S): at 00:29

## 2021-07-27 NOTE — DISCHARGE NOTE NURSING/CASE MANAGEMENT/SOCIAL WORK - PATIENT PORTAL LINK FT
You can access the FollowMyHealth Patient Portal offered by [facility] by registering at the following website: [email or web address]. By joining [employer] FollowMyHealth portal, you will also be able to view your health information using other applications (apps) compatible with our system.

## 2021-07-27 NOTE — DISCHARGE NOTE NURSING/CASE MANAGEMENT/SOCIAL WORK - NSDCFUADDAPPT_GEN_ALL_CORE_FT
Please bring your Insurance card, Photo ID and Discharge paperwork to the following appointment:    (1) Please follow up with your Primary Care Provider, Dr. Hardy Byrd at [address] [place] [address], [place] [address] on 08/02/2021 at 3:20pm.      Appointment was scheduled by Ms. MARISELA Davis, Referral Coordinator.

## 2021-07-28 ENCOUNTER — RX RENEWAL (OUTPATIENT)
Age: 51
End: 2021-07-28

## 2021-08-02 ENCOUNTER — APPOINTMENT (OUTPATIENT)
Dept: INTERNAL MEDICINE | Facility: CLINIC | Age: 51
End: 2021-08-02

## 2021-08-02 DIAGNOSIS — B96.1 KLEBSIELLA PNEUMONIAE [K. PNEUMONIAE] AS THE CAUSE OF DISEASES CLASSIFIED ELSEWHERE: ICD-10-CM

## 2021-08-02 DIAGNOSIS — K21.9 GASTRO-ESOPHAGEAL REFLUX DISEASE WITHOUT ESOPHAGITIS: ICD-10-CM

## 2021-08-02 DIAGNOSIS — D64.9 ANEMIA, UNSPECIFIED: ICD-10-CM

## 2021-08-02 DIAGNOSIS — K59.00 CONSTIPATION, UNSPECIFIED: ICD-10-CM

## 2021-08-02 DIAGNOSIS — N45.4 ABSCESS OF EPIDIDYMIS OR TESTIS: ICD-10-CM

## 2021-08-02 DIAGNOSIS — F32.9 MAJOR DEPRESSIVE DISORDER, SINGLE EPISODE, UNSPECIFIED: ICD-10-CM

## 2021-08-02 DIAGNOSIS — Z72.52 HIGH RISK HOMOSEXUAL BEHAVIOR: ICD-10-CM

## 2021-08-02 DIAGNOSIS — F43.10 POST-TRAUMATIC STRESS DISORDER, UNSPECIFIED: ICD-10-CM

## 2021-08-02 DIAGNOSIS — F41.9 ANXIETY DISORDER, UNSPECIFIED: ICD-10-CM

## 2021-08-02 DIAGNOSIS — N43.1 INFECTED HYDROCELE: ICD-10-CM

## 2021-08-02 DIAGNOSIS — N39.0 URINARY TRACT INFECTION, SITE NOT SPECIFIED: ICD-10-CM

## 2021-08-02 DIAGNOSIS — Z87.440 PERSONAL HISTORY OF URINARY (TRACT) INFECTIONS: ICD-10-CM

## 2021-08-02 DIAGNOSIS — Z87.438 PERSONAL HISTORY OF OTHER DISEASES OF MALE GENITAL ORGANS: ICD-10-CM

## 2021-08-02 DIAGNOSIS — N45.3 EPIDIDYMO-ORCHITIS: ICD-10-CM

## 2021-08-02 DIAGNOSIS — N50.812 LEFT TESTICULAR PAIN: ICD-10-CM

## 2021-08-02 DIAGNOSIS — F14.19 COCAINE ABUSE WITH UNSPECIFIED COCAINE-INDUCED DISORDER: ICD-10-CM

## 2021-08-02 DIAGNOSIS — A74.9 CHLAMYDIAL INFECTION, UNSPECIFIED: ICD-10-CM

## 2021-08-03 ENCOUNTER — APPOINTMENT (OUTPATIENT)
Dept: UROLOGY | Facility: CLINIC | Age: 51
End: 2021-08-03

## 2021-08-17 ENCOUNTER — APPOINTMENT (OUTPATIENT)
Dept: INTERNAL MEDICINE | Facility: CLINIC | Age: 51
End: 2021-08-17

## 2021-08-18 ENCOUNTER — TRANSCRIPTION ENCOUNTER (OUTPATIENT)
Age: 51
End: 2021-08-18

## 2021-10-07 ENCOUNTER — TRANSCRIPTION ENCOUNTER (OUTPATIENT)
Age: 51
End: 2021-10-07

## 2021-10-08 ENCOUNTER — TRANSCRIPTION ENCOUNTER (OUTPATIENT)
Age: 51
End: 2021-10-08

## 2021-11-18 ENCOUNTER — TRANSCRIPTION ENCOUNTER (OUTPATIENT)
Age: 51
End: 2021-11-18

## 2021-11-22 ENCOUNTER — APPOINTMENT (OUTPATIENT)
Dept: INFECTIOUS DISEASE | Facility: CLINIC | Age: 51
End: 2021-11-22
Payer: SELF-PAY

## 2021-11-22 ENCOUNTER — OUTPATIENT (OUTPATIENT)
Dept: OUTPATIENT SERVICES | Facility: HOSPITAL | Age: 51
LOS: 1 days | End: 2021-11-22

## 2021-11-22 VITALS
HEIGHT: 75 IN | BODY MASS INDEX: 24.25 KG/M2 | HEART RATE: 92 BPM | WEIGHT: 195 LBS | SYSTOLIC BLOOD PRESSURE: 133 MMHG | OXYGEN SATURATION: 97 % | TEMPERATURE: 97.3 F | RESPIRATION RATE: 16 BRPM | DIASTOLIC BLOOD PRESSURE: 89 MMHG

## 2021-11-22 DIAGNOSIS — K21.9 GASTRO-ESOPHAGEAL REFLUX DISEASE W/OUT ESOPHAGITIS: ICD-10-CM

## 2021-11-22 DIAGNOSIS — Z79.899 OTHER LONG TERM (CURRENT) DRUG THERAPY: ICD-10-CM

## 2021-11-22 DIAGNOSIS — Z87.448 PERSONAL HISTORY OF OTHER DISEASES OF URINARY SYSTEM: Chronic | ICD-10-CM

## 2021-11-22 LAB
ALBUMIN SERPL ELPH-MCNC: 4.7 G/DL — SIGNIFICANT CHANGE UP (ref 3.3–5)
ALP SERPL-CCNC: 111 U/L — SIGNIFICANT CHANGE UP (ref 40–120)
ALT FLD-CCNC: 24 U/L — SIGNIFICANT CHANGE UP (ref 10–45)
ANION GAP SERPL CALC-SCNC: 10 MMOL/L — SIGNIFICANT CHANGE UP (ref 5–17)
APPEARANCE UR: CLEAR — SIGNIFICANT CHANGE UP
AST SERPL-CCNC: 29 U/L — SIGNIFICANT CHANGE UP (ref 10–40)
BASOPHILS # BLD AUTO: 0.04 K/UL — SIGNIFICANT CHANGE UP (ref 0–0.2)
BASOPHILS NFR BLD AUTO: 0.6 % — SIGNIFICANT CHANGE UP (ref 0–2)
BILIRUB SERPL-MCNC: 0.3 MG/DL — SIGNIFICANT CHANGE UP (ref 0.2–1.2)
BILIRUB UR-MCNC: NEGATIVE — SIGNIFICANT CHANGE UP
BUN SERPL-MCNC: 11 MG/DL — SIGNIFICANT CHANGE UP (ref 7–23)
CALCIUM SERPL-MCNC: 9.6 MG/DL — SIGNIFICANT CHANGE UP (ref 8.4–10.5)
CHLORIDE SERPL-SCNC: 103 MMOL/L — SIGNIFICANT CHANGE UP (ref 96–108)
CO2 SERPL-SCNC: 28 MMOL/L — SIGNIFICANT CHANGE UP (ref 22–31)
COLOR SPEC: YELLOW — SIGNIFICANT CHANGE UP
CREAT SERPL-MCNC: 0.95 MG/DL — SIGNIFICANT CHANGE UP (ref 0.5–1.3)
DIFF PNL FLD: NEGATIVE — SIGNIFICANT CHANGE UP
EOSINOPHIL # BLD AUTO: 0.08 K/UL — SIGNIFICANT CHANGE UP (ref 0–0.5)
EOSINOPHIL NFR BLD AUTO: 1.2 % — SIGNIFICANT CHANGE UP (ref 0–6)
GLUCOSE SERPL-MCNC: 88 MG/DL — SIGNIFICANT CHANGE UP (ref 70–99)
GLUCOSE UR QL: NEGATIVE — SIGNIFICANT CHANGE UP
HBV CORE AB SER-ACNC: SIGNIFICANT CHANGE UP
HBV SURFACE AB SER-ACNC: REACTIVE
HBV SURFACE AG SER-ACNC: SIGNIFICANT CHANGE UP
HCT VFR BLD CALC: 44.4 % — SIGNIFICANT CHANGE UP (ref 39–50)
HCV AB S/CO SERPL IA: 0.04 S/CO — SIGNIFICANT CHANGE UP
HCV AB SERPL-IMP: SIGNIFICANT CHANGE UP
HGB BLD-MCNC: 14.3 G/DL — SIGNIFICANT CHANGE UP (ref 13–17)
HIV 1+2 AB+HIV1 P24 AG SERPL QL IA: SIGNIFICANT CHANGE UP
IMM GRANULOCYTES NFR BLD AUTO: 0.4 % — SIGNIFICANT CHANGE UP (ref 0–1.5)
KETONES UR-MCNC: NEGATIVE — SIGNIFICANT CHANGE UP
LEUKOCYTE ESTERASE UR-ACNC: NEGATIVE — SIGNIFICANT CHANGE UP
LYMPHOCYTES # BLD AUTO: 1.81 K/UL — SIGNIFICANT CHANGE UP (ref 1–3.3)
LYMPHOCYTES # BLD AUTO: 26.3 % — SIGNIFICANT CHANGE UP (ref 13–44)
MCHC RBC-ENTMCNC: 29 PG — SIGNIFICANT CHANGE UP (ref 27–34)
MCHC RBC-ENTMCNC: 32.2 GM/DL — SIGNIFICANT CHANGE UP (ref 32–36)
MCV RBC AUTO: 90.1 FL — SIGNIFICANT CHANGE UP (ref 80–100)
MONOCYTES # BLD AUTO: 0.63 K/UL — SIGNIFICANT CHANGE UP (ref 0–0.9)
MONOCYTES NFR BLD AUTO: 9.1 % — SIGNIFICANT CHANGE UP (ref 2–14)
NEUTROPHILS # BLD AUTO: 4.3 K/UL — SIGNIFICANT CHANGE UP (ref 1.8–7.4)
NEUTROPHILS NFR BLD AUTO: 62.4 % — SIGNIFICANT CHANGE UP (ref 43–77)
NITRITE UR-MCNC: NEGATIVE — SIGNIFICANT CHANGE UP
NRBC # BLD: 0 /100 WBCS — SIGNIFICANT CHANGE UP (ref 0–0)
PH UR: 6 — SIGNIFICANT CHANGE UP (ref 5–8)
PLATELET # BLD AUTO: 298 K/UL — SIGNIFICANT CHANGE UP (ref 150–400)
POTASSIUM SERPL-MCNC: 4.1 MMOL/L — SIGNIFICANT CHANGE UP (ref 3.5–5.3)
POTASSIUM SERPL-SCNC: 4.1 MMOL/L — SIGNIFICANT CHANGE UP (ref 3.5–5.3)
PROT SERPL-MCNC: 7.9 G/DL — SIGNIFICANT CHANGE UP (ref 6–8.3)
PROT UR-MCNC: NEGATIVE MG/DL — SIGNIFICANT CHANGE UP
RBC # BLD: 4.93 M/UL — SIGNIFICANT CHANGE UP (ref 4.2–5.8)
RBC # FLD: 13.4 % — SIGNIFICANT CHANGE UP (ref 10.3–14.5)
SODIUM SERPL-SCNC: 141 MMOL/L — SIGNIFICANT CHANGE UP (ref 135–145)
SP GR SPEC: 1.02 — SIGNIFICANT CHANGE UP (ref 1–1.03)
UROBILINOGEN FLD QL: 1 E.U./DL — SIGNIFICANT CHANGE UP
WBC # BLD: 6.89 K/UL — SIGNIFICANT CHANGE UP (ref 3.8–10.5)
WBC # FLD AUTO: 6.89 K/UL — SIGNIFICANT CHANGE UP (ref 3.8–10.5)

## 2021-11-22 PROCEDURE — 99204 OFFICE O/P NEW MOD 45 MIN: CPT

## 2021-11-22 RX ORDER — EMTRICITABINE AND TENOFOVIR ALAFENAMIDE 200; 25 MG/1; MG/1
200-25 TABLET ORAL
Qty: 30 | Refills: 3 | Status: DISCONTINUED | COMMUNITY
Start: 2021-11-22 | End: 2021-11-22

## 2021-11-23 DIAGNOSIS — F32.4 MAJOR DEPRESSIVE DISORDER, SINGLE EPISODE, IN PARTIAL REMISSION: ICD-10-CM

## 2021-11-23 DIAGNOSIS — Z79.899 OTHER LONG TERM (CURRENT) DRUG THERAPY: ICD-10-CM

## 2021-11-23 DIAGNOSIS — F41.9 ANXIETY DISORDER, UNSPECIFIED: ICD-10-CM

## 2021-11-23 DIAGNOSIS — Z11.3 ENCOUNTER FOR SCREENING FOR INFECTIONS WITH A PREDOMINANTLY SEXUAL MODE OF TRANSMISSION: ICD-10-CM

## 2021-11-23 DIAGNOSIS — K21.9 GASTRO-ESOPHAGEAL REFLUX DISEASE WITHOUT ESOPHAGITIS: ICD-10-CM

## 2021-11-23 LAB
C TRACH RRNA SPEC QL NAA+PROBE: SIGNIFICANT CHANGE UP
N GONORRHOEA RRNA SPEC QL NAA+PROBE: SIGNIFICANT CHANGE UP
SPECIMEN SOURCE: SIGNIFICANT CHANGE UP
T PALLIDUM AB TITR SER: NEGATIVE — SIGNIFICANT CHANGE UP

## 2021-11-23 NOTE — PLAN
[FreeTextEntry1] : 51MSM presents for acute visit to renew meds.\par \par Anxiety w/ chronic benzo use: Provided 30 day Rx for clonazepam.  Advised that patient follow up w/ Dr. Byrd upon his return.  Renewed Bupropion.  On Lexapro.\par \par High risk MSM on PrEP: Attempted to rx Descovy but it was not covered by insurance.  Renew Truvada pending negative HIV test.  Oral/Urine GC/Chlamydia, syphilis, Hep C/B testing today.\par \par LUTS: UA, STD testing per above.  Will tx w/ abx pending result\par \par Renew PPI for chronic GERD.\par \par Patient to follow up w/ his regular PCP.\par \par I spent more than 45 minutes for the total time of this encounter, including time spent face-to-face with the patient, chart review, coordinating care, and documentation.

## 2021-11-23 NOTE — HISTORY OF PRESENT ILLNESS
[FreeTextEntry8] : 51MSM w/ anxiety on chronic benzos presents for PrEP renewal.\par \par Patient usually sees Dr Byrd, who is out on medical leave.  He is concerned about not having enough clonazepam and potentially going into withdrawal.  He has been on stable dose of clonazepam for 20 years.  \par \par He is sexually active w/ men, top only.  No recent HIV/STD testing.  On Truvada for PrEP (no [place]y due to insurance not covering).  He has recently been having more, unprotected, sex after his  filed for divorce.\par \par He is also complaining of urinary symptoms.  He says he has a 'tingling' at the tip of his penis.  No overt burning, discharge, discoloration.\par \par Patient uses GHB during sex.  Occasional EtOH.

## 2021-12-20 ENCOUNTER — RX RENEWAL (OUTPATIENT)
Age: 51
End: 2021-12-20

## 2022-01-10 ENCOUNTER — APPOINTMENT (OUTPATIENT)
Dept: INFECTIOUS DISEASE | Facility: CLINIC | Age: 52
End: 2022-01-10

## 2022-01-19 ENCOUNTER — TRANSCRIPTION ENCOUNTER (OUTPATIENT)
Age: 52
End: 2022-01-19

## 2022-01-19 ENCOUNTER — RX RENEWAL (OUTPATIENT)
Age: 52
End: 2022-01-19

## 2022-03-09 ENCOUNTER — TRANSCRIPTION ENCOUNTER (OUTPATIENT)
Age: 52
End: 2022-03-09

## 2022-03-10 ENCOUNTER — TRANSCRIPTION ENCOUNTER (OUTPATIENT)
Age: 52
End: 2022-03-10

## 2022-03-18 ENCOUNTER — TRANSCRIPTION ENCOUNTER (OUTPATIENT)
Age: 52
End: 2022-03-18

## 2022-04-21 ENCOUNTER — RX RENEWAL (OUTPATIENT)
Age: 52
End: 2022-04-21

## 2022-06-30 ENCOUNTER — RX RENEWAL (OUTPATIENT)
Age: 52
End: 2022-06-30

## 2022-07-11 ENCOUNTER — APPOINTMENT (OUTPATIENT)
Dept: INTERNAL MEDICINE | Facility: CLINIC | Age: 52
End: 2022-07-11

## 2022-07-11 ENCOUNTER — TRANSCRIPTION ENCOUNTER (OUTPATIENT)
Age: 52
End: 2022-07-11

## 2022-07-11 VITALS
HEIGHT: 75 IN | BODY MASS INDEX: 23.87 KG/M2 | OXYGEN SATURATION: 98 % | HEART RATE: 86 BPM | DIASTOLIC BLOOD PRESSURE: 90 MMHG | TEMPERATURE: 97.8 F | SYSTOLIC BLOOD PRESSURE: 130 MMHG | WEIGHT: 192 LBS

## 2022-07-11 PROCEDURE — 36415 COLL VENOUS BLD VENIPUNCTURE: CPT

## 2022-07-11 PROCEDURE — 99213 OFFICE O/P EST LOW 20 MIN: CPT | Mod: 25

## 2022-07-11 RX ORDER — TOBRAMYCIN AND DEXAMETHASONE 3; 1 MG/ML; MG/ML
0.3-0.1 SUSPENSION/ DROPS OPHTHALMIC
Qty: 5 | Refills: 0 | Status: COMPLETED | COMMUNITY
Start: 2022-04-11

## 2022-07-11 RX ORDER — METRONIDAZOLE 500 MG/1
500 TABLET ORAL
Qty: 14 | Refills: 0 | Status: COMPLETED | COMMUNITY
Start: 2022-03-05

## 2022-07-11 RX ORDER — LEVOFLOXACIN 750 MG/1
750 TABLET, FILM COATED ORAL
Qty: 7 | Refills: 0 | Status: COMPLETED | COMMUNITY
Start: 2022-05-19

## 2022-07-11 RX ORDER — SILDENAFIL 100 MG/1
100 TABLET, FILM COATED ORAL
Qty: 30 | Refills: 6 | Status: COMPLETED | COMMUNITY
Start: 2019-09-03 | End: 2022-07-11

## 2022-07-12 RX ORDER — EMTRICITABINE AND TENOFOVIR DISOPROXIL FUMARATE 200; 300 MG/1; MG/1
200-300 TABLET, FILM COATED ORAL
Qty: 30 | Refills: 2 | Status: COMPLETED | COMMUNITY
Start: 2021-11-22 | End: 2022-07-12

## 2022-07-12 NOTE — PHYSICAL EXAM
[No Edema] : there was no peripheral edema [PERRL] : pupils equal round and reactive to light [No JVD] : no jugular venous distention [No Rash] : no rash [Normal] : affect was normal and insight and judgment were intact [de-identified] : no asterixis

## 2022-07-12 NOTE — HISTORY OF PRESENT ILLNESS
[FreeTextEntry1] : f/u of depression, anxiety, and prep therapy [de-identified] : Pt is a 53 y/o M with PMHx of anxiety, depression who presents to the office today for f/u of depression, anxiety, and prep therapy\par \par Depression:\par - Reports great improvement with Lexapro and Wellbutrin\par - f/u with LMSW 1 x week\par \par Anxiety:\par - f/u with LMSW\par - c/w clonazepam as needed.  Pt reports using it sparingly\par \par Prep\par - does not want to check G/C rectal swab- Pt is not receptive\par - denies any dysuria, urethral d/c, lesions/ ulcers

## 2022-07-13 ENCOUNTER — TRANSCRIPTION ENCOUNTER (OUTPATIENT)
Age: 52
End: 2022-07-13

## 2022-07-13 LAB
ALBUMIN SERPL ELPH-MCNC: 4.5 G/DL
ALP BLD-CCNC: 109 U/L
ALT SERPL-CCNC: 26 U/L
ANION GAP SERPL CALC-SCNC: 15 MMOL/L
APPEARANCE: CLEAR
AST SERPL-CCNC: 28 U/L
BILIRUB SERPL-MCNC: 0.2 MG/DL
BILIRUBIN URINE: NEGATIVE
BLOOD URINE: NEGATIVE
BUN SERPL-MCNC: 14 MG/DL
C TRACH RRNA SPEC QL NAA+PROBE: NOT DETECTED
C TRACH RRNA SPEC QL NAA+PROBE: NOT DETECTED
CALCIUM SERPL-MCNC: 9.6 MG/DL
CHLORIDE SERPL-SCNC: 102 MMOL/L
CO2 SERPL-SCNC: 23 MMOL/L
COLOR: NORMAL
CREAT SERPL-MCNC: 0.83 MG/DL
EGFR: 106 ML/MIN/1.73M2
GLUCOSE QUALITATIVE U: NEGATIVE
GLUCOSE SERPL-MCNC: 101 MG/DL
HCV AB SER QL: NONREACTIVE
HCV S/CO RATIO: 0.11 S/CO
HIV1+2 AB SPEC QL IA.RAPID: NONREACTIVE
KETONES URINE: NEGATIVE
LEUKOCYTE ESTERASE URINE: NEGATIVE
N GONORRHOEA RRNA SPEC QL NAA+PROBE: NOT DETECTED
N GONORRHOEA RRNA SPEC QL NAA+PROBE: NOT DETECTED
NITRITE URINE: NEGATIVE
PH URINE: 6.5
POTASSIUM SERPL-SCNC: 4.4 MMOL/L
PROT SERPL-MCNC: 6.7 G/DL
PROTEIN URINE: NEGATIVE
SODIUM SERPL-SCNC: 140 MMOL/L
SOURCE AMPLIFICATION: NORMAL
SOURCE ORAL: NORMAL
SPECIFIC GRAVITY URINE: 1.01
T PALLIDUM AB SER QL IA: NEGATIVE
UROBILINOGEN URINE: NORMAL

## 2022-10-14 ENCOUNTER — RX RENEWAL (OUTPATIENT)
Age: 52
End: 2022-10-14

## 2022-10-28 NOTE — H&P ADULT - NSHPSOCIALHISTORY_GEN_ALL_CORE
No
Denies smoking, alcohol, or drug use. Sexually active with 25 male partners (insertive sex) in the past 6 months. Does not use protection. On Truvada. Lives at home. Works as a psychologist. Ambulates independently.

## 2022-11-01 ENCOUNTER — TRANSCRIPTION ENCOUNTER (OUTPATIENT)
Age: 52
End: 2022-11-01

## 2022-11-02 ENCOUNTER — APPOINTMENT (OUTPATIENT)
Dept: INTERNAL MEDICINE | Facility: CLINIC | Age: 52
End: 2022-11-02

## 2022-11-02 ENCOUNTER — LABORATORY RESULT (OUTPATIENT)
Age: 52
End: 2022-11-02

## 2022-11-02 VITALS
HEART RATE: 80 BPM | HEIGHT: 75 IN | SYSTOLIC BLOOD PRESSURE: 106 MMHG | TEMPERATURE: 98.1 F | RESPIRATION RATE: 16 BRPM | OXYGEN SATURATION: 99 % | BODY MASS INDEX: 23.62 KG/M2 | WEIGHT: 190 LBS | DIASTOLIC BLOOD PRESSURE: 68 MMHG

## 2022-11-02 DIAGNOSIS — F10.10 ALCOHOL ABUSE, UNCOMPLICATED: ICD-10-CM

## 2022-11-02 DIAGNOSIS — G47.00 INSOMNIA, UNSPECIFIED: ICD-10-CM

## 2022-11-02 DIAGNOSIS — Z23 ENCOUNTER FOR IMMUNIZATION: ICD-10-CM

## 2022-11-02 PROCEDURE — 99214 OFFICE O/P EST MOD 30 MIN: CPT | Mod: 25

## 2022-11-02 PROCEDURE — 36415 COLL VENOUS BLD VENIPUNCTURE: CPT

## 2022-11-02 NOTE — HISTORY OF PRESENT ILLNESS
[FreeTextEntry1] :  f/u for prep therapy\par  [de-identified] : Pt is a 53 y/o M with PMHx of EtOH abuse, anxiety and depression, prep therapy who presents to the office today for f/u for prep therapy\par \par PreP:\par - Pt reports he needs a refill\par \par EtOH use:\par Pt reports he has been sober for 2 years. \par - takes naltrexone prn if he knows he is going to be around EtOH\par \par Anxiety & depression:\par - therapy once a week has been going well\par - denies sucidal thoughts or thoughts of arming self or others

## 2022-11-03 ENCOUNTER — TRANSCRIPTION ENCOUNTER (OUTPATIENT)
Age: 52
End: 2022-11-03

## 2022-11-03 LAB
APPEARANCE: CLEAR
BILIRUBIN URINE: NEGATIVE
BLOOD URINE: NEGATIVE
C TRACH RRNA SPEC QL NAA+PROBE: NOT DETECTED
C TRACH RRNA SPEC QL NAA+PROBE: NOT DETECTED
COLOR: YELLOW
GLUCOSE QUALITATIVE U: NEGATIVE
HIV1+2 AB SPEC QL IA.RAPID: NONREACTIVE
KETONES URINE: NEGATIVE
LEUKOCYTE ESTERASE URINE: ABNORMAL
N GONORRHOEA RRNA SPEC QL NAA+PROBE: NOT DETECTED
N GONORRHOEA RRNA SPEC QL NAA+PROBE: NOT DETECTED
NITRITE URINE: NEGATIVE
PH URINE: 6.5
PROTEIN URINE: NORMAL
SOURCE AMPLIFICATION: NORMAL
SOURCE ORAL: NORMAL
SPECIFIC GRAVITY URINE: 1.03
T PALLIDUM AB SER QL IA: NEGATIVE
UROBILINOGEN URINE: NORMAL

## 2022-11-05 ENCOUNTER — RX RENEWAL (OUTPATIENT)
Age: 52
End: 2022-11-05

## 2023-01-20 NOTE — ED ADULT NURSE NOTE - NSFALLRSKASSESSDT_ED_ALL_ED
Topical Antifungal Recommendations: Ketoconazole cream Detail Level: Zone Detail Level: Detailed 24-Jul-2021 14:20

## 2023-04-14 ENCOUNTER — LABORATORY RESULT (OUTPATIENT)
Age: 53
End: 2023-04-14

## 2023-04-14 ENCOUNTER — APPOINTMENT (OUTPATIENT)
Dept: INTERNAL MEDICINE | Facility: CLINIC | Age: 53
End: 2023-04-14
Payer: SELF-PAY

## 2023-04-14 VITALS
WEIGHT: 193 LBS | BODY MASS INDEX: 24 KG/M2 | TEMPERATURE: 98.2 F | SYSTOLIC BLOOD PRESSURE: 134 MMHG | DIASTOLIC BLOOD PRESSURE: 87 MMHG | HEIGHT: 75 IN | HEART RATE: 102 BPM | OXYGEN SATURATION: 97 %

## 2023-04-14 DIAGNOSIS — N52.9 MALE ERECTILE DYSFUNCTION, UNSPECIFIED: ICD-10-CM

## 2023-04-14 DIAGNOSIS — L64.9 ANDROGENIC ALOPECIA, UNSPECIFIED: ICD-10-CM

## 2023-04-14 DIAGNOSIS — Z79.899 OTHER LONG TERM (CURRENT) DRUG THERAPY: ICD-10-CM

## 2023-04-14 DIAGNOSIS — F32.A ANXIETY DISORDER, UNSPECIFIED: ICD-10-CM

## 2023-04-14 DIAGNOSIS — F41.9 ANXIETY DISORDER, UNSPECIFIED: ICD-10-CM

## 2023-04-14 DIAGNOSIS — Z11.3 ENCOUNTER FOR SCREENING FOR INFECTIONS WITH A PREDOMINANTLY SEXUAL MODE OF TRANSMISSION: ICD-10-CM

## 2023-04-14 PROCEDURE — 99214 OFFICE O/P EST MOD 30 MIN: CPT | Mod: 25

## 2023-04-14 PROCEDURE — 36415 COLL VENOUS BLD VENIPUNCTURE: CPT

## 2023-04-14 RX ORDER — CLONAZEPAM 1 MG/1
1 TABLET ORAL TWICE DAILY
Qty: 60 | Refills: 0 | Status: ACTIVE | COMMUNITY
Start: 1900-01-01 | End: 1900-01-01

## 2023-04-14 RX ORDER — TRAZODONE HYDROCHLORIDE 100 MG/1
100 TABLET ORAL
Qty: 60 | Refills: 2 | Status: ACTIVE | COMMUNITY
Start: 2021-07-28 | End: 1900-01-01

## 2023-04-14 RX ORDER — BUPROPION HYDROCHLORIDE 150 MG/1
150 TABLET, EXTENDED RELEASE ORAL
Qty: 180 | Refills: 0 | Status: ACTIVE | COMMUNITY
Start: 2020-03-20 | End: 1900-01-01

## 2023-04-14 RX ORDER — OMEPRAZOLE 40 MG/1
40 CAPSULE, DELAYED RELEASE ORAL
Qty: 90 | Refills: 3 | Status: ACTIVE | COMMUNITY
Start: 2019-11-08 | End: 1900-01-01

## 2023-04-14 RX ORDER — FINASTERIDE 5 MG/1
5 TABLET, FILM COATED ORAL DAILY
Qty: 90 | Refills: 3 | Status: ACTIVE | COMMUNITY
Start: 2020-03-20 | End: 1900-01-01

## 2023-04-14 RX ORDER — ESCITALOPRAM OXALATE 20 MG/1
20 TABLET ORAL DAILY
Qty: 90 | Refills: 3 | Status: ACTIVE | COMMUNITY
Start: 2020-02-13 | End: 1900-01-01

## 2023-04-14 RX ORDER — TADALAFIL 20 MG/1
20 TABLET ORAL
Qty: 30 | Refills: 3 | Status: ACTIVE | COMMUNITY
Start: 2021-01-13 | End: 1900-01-01

## 2023-04-14 RX ORDER — NALTREXONE HYDROCHLORIDE 50 MG/1
50 TABLET, FILM COATED ORAL
Qty: 90 | Refills: 3 | Status: ACTIVE | COMMUNITY
Start: 2020-02-13 | End: 1900-01-01

## 2023-04-14 NOTE — PHYSICAL EXAM
[Normal] : no acute distress, well nourished, well developed and well-appearing [No Respiratory Distress] : no respiratory distress  [de-identified] : anxious affect, normal mood

## 2023-04-14 NOTE — HISTORY OF PRESENT ILLNESS
[FreeTextEntry1] : STD screen and medication refill [de-identified] : Anxiety\par - complient w/ medications\par - mood much improved\par \par PrEP\par - needs refill and STD screening today\par \par EtOH abuse\par - naltrexone has decreased craving \par - would like refill\par \par Medications reviewed w/ pt and refilled \par - advised to schedule annual physical now that insurance will be active.

## 2023-04-17 ENCOUNTER — TRANSCRIPTION ENCOUNTER (OUTPATIENT)
Age: 53
End: 2023-04-17

## 2023-04-17 DIAGNOSIS — R82.81 PYURIA: ICD-10-CM

## 2023-04-17 LAB
ALBUMIN SERPL ELPH-MCNC: 4.5 G/DL
ALP BLD-CCNC: 98 U/L
ALT SERPL-CCNC: 22 U/L
ANION GAP SERPL CALC-SCNC: 15 MMOL/L
APPEARANCE: CLEAR
AST SERPL-CCNC: 27 U/L
BILIRUB SERPL-MCNC: 0.3 MG/DL
BILIRUBIN URINE: NEGATIVE
BLOOD URINE: NEGATIVE
BUN SERPL-MCNC: 15 MG/DL
C TRACH RRNA SPEC QL NAA+PROBE: NOT DETECTED
C TRACH RRNA SPEC QL NAA+PROBE: NOT DETECTED
CALCIUM SERPL-MCNC: 10 MG/DL
CHLORIDE SERPL-SCNC: 101 MMOL/L
CO2 SERPL-SCNC: 24 MMOL/L
COLOR: YELLOW
CREAT SERPL-MCNC: 1.02 MG/DL
EGFR: 89 ML/MIN/1.73M2
GLUCOSE QUALITATIVE U: NEGATIVE
GLUCOSE SERPL-MCNC: 92 MG/DL
HIV1+2 AB SPEC QL IA.RAPID: NONREACTIVE
KETONES URINE: NEGATIVE
LEUKOCYTE ESTERASE URINE: ABNORMAL
N GONORRHOEA RRNA SPEC QL NAA+PROBE: NOT DETECTED
N GONORRHOEA RRNA SPEC QL NAA+PROBE: NOT DETECTED
NITRITE URINE: NEGATIVE
PH URINE: 6.5
POTASSIUM SERPL-SCNC: 4.3 MMOL/L
PROT SERPL-MCNC: 7 G/DL
PROTEIN URINE: NORMAL
SODIUM SERPL-SCNC: 140 MMOL/L
SOURCE AMPLIFICATION: NORMAL
SOURCE ORAL: NORMAL
SPECIFIC GRAVITY URINE: 1.02
T PALLIDUM AB SER QL IA: NEGATIVE
UROBILINOGEN URINE: ABNORMAL

## 2023-04-17 RX ORDER — EMTRICITABINE AND TENOFOVIR DISOPROXIL FUMARATE 200; 300 MG/1; MG/1
200-300 TABLET, FILM COATED ORAL
Qty: 90 | Refills: 0 | Status: ACTIVE | COMMUNITY
Start: 2021-12-20 | End: 1900-01-01

## 2023-07-24 ENCOUNTER — RX RENEWAL (OUTPATIENT)
Age: 53
End: 2023-07-24

## 2023-09-21 NOTE — ED PROVIDER NOTE - EYES NEGATIVE STATEMENT, MLM
no discharge, no irritation, no pain, no redness, and no visual changes. Referred To Oculoplastics For Closure Text (Leave Blank If You Do Not Want): After obtaining clear surgical margins the patient was sent to oculoplastics for surgical repair.  The patient understands they will receive post-surgical care and follow-up from the repairing physician's office.

## 2024-09-18 ENCOUNTER — APPOINTMENT (OUTPATIENT)
Dept: INTERNAL MEDICINE | Facility: CLINIC | Age: 54
End: 2024-09-18
Payer: SELF-PAY

## 2024-09-18 VITALS
RESPIRATION RATE: 18 BRPM | DIASTOLIC BLOOD PRESSURE: 75 MMHG | TEMPERATURE: 97.7 F | HEIGHT: 75 IN | BODY MASS INDEX: 23 KG/M2 | OXYGEN SATURATION: 98 % | SYSTOLIC BLOOD PRESSURE: 127 MMHG | WEIGHT: 185 LBS | HEART RATE: 100 BPM

## 2024-09-18 DIAGNOSIS — Z79.899 OTHER LONG TERM (CURRENT) DRUG THERAPY: ICD-10-CM

## 2024-09-18 DIAGNOSIS — N45.3 EPIDIDYMO-ORCHITIS: ICD-10-CM

## 2024-09-18 DIAGNOSIS — Z11.3 ENCOUNTER FOR SCREENING FOR INFECTIONS WITH A PREDOMINANTLY SEXUAL MODE OF TRANSMISSION: ICD-10-CM

## 2024-09-18 DIAGNOSIS — K21.9 GASTRO-ESOPHAGEAL REFLUX DISEASE W/OUT ESOPHAGITIS: ICD-10-CM

## 2024-09-18 PROCEDURE — 99213 OFFICE O/P EST LOW 20 MIN: CPT

## 2024-09-18 PROCEDURE — 36415 COLL VENOUS BLD VENIPUNCTURE: CPT

## 2024-09-18 PROCEDURE — G2211 COMPLEX E/M VISIT ADD ON: CPT

## 2024-09-18 NOTE — HISTORY OF PRESENT ILLNESS
[FreeTextEntry1] : STI/PrEP f/u [de-identified] : Anxiety - compliant w/ medications - no adverse effects  PrEP - needs refill and STD screening today - would also like DoxyPEP  EtOH abuse - is completley etoh free

## 2024-09-18 NOTE — PHYSICAL EXAM
[No Acute Distress] : no acute distress [No Edema] : there was no peripheral edema [Alert and Oriented x3] : oriented to person, place, and time [de-identified] : anxious affect, normal mood

## 2024-09-20 ENCOUNTER — TRANSCRIPTION ENCOUNTER (OUTPATIENT)
Age: 54
End: 2024-09-20

## 2024-09-20 LAB
ALBUMIN SERPL ELPH-MCNC: 4.5 G/DL
ALP BLD-CCNC: 106 U/L
ALT SERPL-CCNC: 17 U/L
ANION GAP SERPL CALC-SCNC: 11 MMOL/L
APPEARANCE: CLEAR
AST SERPL-CCNC: 22 U/L
BACTERIA: NEGATIVE /HPF
BILIRUB SERPL-MCNC: 0.2 MG/DL
BILIRUBIN URINE: NEGATIVE
BLOOD URINE: NEGATIVE
BUN SERPL-MCNC: 17 MG/DL
C TRACH RRNA SPEC QL NAA+PROBE: NOT DETECTED
C TRACH RRNA SPEC QL NAA+PROBE: NOT DETECTED
CALCIUM SERPL-MCNC: 9.6 MG/DL
CAST: 0 /LPF
CHLORIDE SERPL-SCNC: 101 MMOL/L
CO2 SERPL-SCNC: 27 MMOL/L
COLOR: YELLOW
CREAT SERPL-MCNC: 0.93 MG/DL
EGFR: 98 ML/MIN/1.73M2
EPITHELIAL CELLS: 0 /HPF
GLUCOSE QUALITATIVE U: NEGATIVE MG/DL
GLUCOSE SERPL-MCNC: 127 MG/DL
HIV1+2 AB SPEC QL IA.RAPID: NONREACTIVE
KETONES URINE: NEGATIVE MG/DL
LEUKOCYTE ESTERASE URINE: NEGATIVE
MICROSCOPIC-UA: NORMAL
N GONORRHOEA RRNA SPEC QL NAA+PROBE: NOT DETECTED
N GONORRHOEA RRNA SPEC QL NAA+PROBE: NOT DETECTED
NITRITE URINE: NEGATIVE
PH URINE: 6
POTASSIUM SERPL-SCNC: 4.4 MMOL/L
PROT SERPL-MCNC: 7 G/DL
PROTEIN URINE: NEGATIVE MG/DL
RED BLOOD CELLS URINE: 0 /HPF
SODIUM SERPL-SCNC: 140 MMOL/L
SOURCE AMPLIFICATION: NORMAL
SOURCE ORAL: NORMAL
SPECIFIC GRAVITY URINE: 1.02
T PALLIDUM AB SER QL IA: NEGATIVE
UROBILINOGEN URINE: 1 MG/DL
WHITE BLOOD CELLS URINE: 1 /HPF

## 2024-09-20 RX ORDER — DOXYCYCLINE HYCLATE 100 MG/1
100 CAPSULE ORAL
Qty: 60 | Refills: 0 | Status: ACTIVE | COMMUNITY
Start: 2024-09-18 | End: 1900-01-01

## 2025-06-23 ENCOUNTER — TRANSCRIPTION ENCOUNTER (OUTPATIENT)
Age: 55
End: 2025-06-23

## 2025-06-24 ENCOUNTER — TRANSCRIPTION ENCOUNTER (OUTPATIENT)
Age: 55
End: 2025-06-24

## 2025-06-27 NOTE — H&P ADULT - ASSESSMENT
Caller: Brianna Escalera    Relationship: Self    Best call back number: Mobile phone: [phone]     What is the best time to reach you: ANYTIME    What was the call regarding: PT IS WANTING TO KNOW IF SHE CAN HAVE HEMORRHOIDS BANDED BEFORE HER ABLATION. PLEASE CALL BACK TO ADVISE, THANK YOU.       50 yo with hx anxiety, depression, gonorrhea, h/o UTI, pyelonephritis and cocaine use, on Truvada presenting to ER for left scrotal swelling and pain that has worsening today. He reports subjective fevers at home and chills.  He had an US scrotal today that showed since 4/22/2021, there has been worsening of left epididymoorchitis. There is a new hypoechoic, avascular focus within the left epididymal tail which may represent abscess. WBC 13.70. VSS 51M PMH depression/anxiety, gonorrhea, frequent UTIs complicated by pyelonephritis (5 years ago) s/p urethral dilatation for stricture as a child, MSM (on Truvada) presenting with subjective fevers, fatigue, generalized weakness, suprapubic pain and left scrotal pain and swelling for the past 6 days admitted for treatment of worsening left epididymoorchitis.

## 2025-07-03 RX ORDER — SILDENAFIL 100 MG/1
100 TABLET, FILM COATED ORAL
Qty: 30 | Refills: 3 | Status: ACTIVE | COMMUNITY
Start: 2025-06-23